# Patient Record
Sex: FEMALE | Race: OTHER | Employment: FULL TIME | URBAN - METROPOLITAN AREA
[De-identification: names, ages, dates, MRNs, and addresses within clinical notes are randomized per-mention and may not be internally consistent; named-entity substitution may affect disease eponyms.]

---

## 2019-05-28 ENCOUNTER — HOSPITAL ENCOUNTER (OUTPATIENT)
Dept: PHYSICAL THERAPY | Age: 54
Discharge: HOME | End: 2019-05-28
Payer: COMMERCIAL

## 2019-05-28 ENCOUNTER — HOSPITAL ENCOUNTER (OUTPATIENT)
Dept: SURGERY | Age: 54
Discharge: HOME | End: 2019-05-28
Payer: COMMERCIAL

## 2019-05-28 ENCOUNTER — HOME HEALTH ADMISSION (OUTPATIENT)
Dept: HOME HEALTH SERVICES | Facility: HOME HEALTH | Age: 54
End: 2019-05-28
Payer: COMMERCIAL

## 2019-05-28 VITALS
TEMPERATURE: 96.1 F | HEART RATE: 57 BPM | WEIGHT: 188.13 LBS | DIASTOLIC BLOOD PRESSURE: 81 MMHG | OXYGEN SATURATION: 100 % | BODY MASS INDEX: 36.93 KG/M2 | RESPIRATION RATE: 18 BRPM | HEIGHT: 60 IN | SYSTOLIC BLOOD PRESSURE: 154 MMHG

## 2019-05-28 LAB
ANION GAP SERPL CALC-SCNC: 8 MMOL/L (ref 7–16)
APPEARANCE UR: CLEAR
APTT PPP: 31.5 SEC (ref 24.7–39.8)
ATRIAL RATE: 55 BPM
BACTERIA SPEC CULT: ABNORMAL
BASOPHILS # BLD: 0.1 K/UL (ref 0–0.2)
BASOPHILS NFR BLD: 1 % (ref 0–2)
BILIRUB UR QL: NEGATIVE
BUN SERPL-MCNC: 25 MG/DL (ref 6–23)
CALCIUM SERPL-MCNC: 9 MG/DL (ref 8.3–10.4)
CALCULATED P AXIS, ECG09: 22 DEGREES
CALCULATED R AXIS, ECG10: -19 DEGREES
CALCULATED T AXIS, ECG11: 16 DEGREES
CHLORIDE SERPL-SCNC: 103 MMOL/L (ref 98–107)
CO2 SERPL-SCNC: 28 MMOL/L (ref 21–32)
COLOR UR: YELLOW
CREAT SERPL-MCNC: 0.72 MG/DL (ref 0.6–1)
DIAGNOSIS, 93000: NORMAL
DIFFERENTIAL METHOD BLD: NORMAL
EOSINOPHIL # BLD: 0.2 K/UL (ref 0–0.8)
EOSINOPHIL NFR BLD: 2 % (ref 0.5–7.8)
ERYTHROCYTE [DISTWIDTH] IN BLOOD BY AUTOMATED COUNT: 12.8 % (ref 11.9–14.6)
GLUCOSE SERPL-MCNC: 87 MG/DL (ref 65–100)
GLUCOSE UR STRIP.AUTO-MCNC: NEGATIVE MG/DL
HCT VFR BLD AUTO: 39.4 % (ref 35.8–46.3)
HGB BLD-MCNC: 12.9 G/DL (ref 11.7–15.4)
HGB UR QL STRIP: NEGATIVE
IMM GRANULOCYTES # BLD AUTO: 0 K/UL (ref 0–0.5)
IMM GRANULOCYTES NFR BLD AUTO: 0 % (ref 0–5)
INR PPP: 0.9
KETONES UR QL STRIP.AUTO: NEGATIVE MG/DL
LEUKOCYTE ESTERASE UR QL STRIP.AUTO: NEGATIVE
LYMPHOCYTES # BLD: 3 K/UL (ref 0.5–4.6)
LYMPHOCYTES NFR BLD: 37 % (ref 13–44)
MCH RBC QN AUTO: 29.9 PG (ref 26.1–32.9)
MCHC RBC AUTO-ENTMCNC: 32.7 G/DL (ref 31.4–35)
MCV RBC AUTO: 91.4 FL (ref 79.6–97.8)
MONOCYTES # BLD: 0.7 K/UL (ref 0.1–1.3)
MONOCYTES NFR BLD: 8 % (ref 4–12)
NEUTS SEG # BLD: 4 K/UL (ref 1.7–8.2)
NEUTS SEG NFR BLD: 51 % (ref 43–78)
NITRITE UR QL STRIP.AUTO: NEGATIVE
NRBC # BLD: 0 K/UL (ref 0–0.2)
P-R INTERVAL, ECG05: 214 MS
PH UR STRIP: 6.5 [PH] (ref 5–9)
PLATELET # BLD AUTO: 274 K/UL (ref 150–450)
PMV BLD AUTO: 11.4 FL (ref 9.4–12.3)
POTASSIUM SERPL-SCNC: 3.9 MMOL/L (ref 3.5–5.1)
PROT UR STRIP-MCNC: NEGATIVE MG/DL
PROTHROMBIN TIME: 12.7 SEC (ref 11.7–14.5)
Q-T INTERVAL, ECG07: 444 MS
QRS DURATION, ECG06: 104 MS
QTC CALCULATION (BEZET), ECG08: 424 MS
RBC # BLD AUTO: 4.31 M/UL (ref 4.05–5.2)
SERVICE CMNT-IMP: ABNORMAL
SODIUM SERPL-SCNC: 139 MMOL/L (ref 136–145)
SP GR UR REFRACTOMETRY: 1.01 (ref 1–1.02)
UROBILINOGEN UR QL STRIP.AUTO: 0.2 EU/DL (ref 0.2–1)
VENTRICULAR RATE, ECG03: 55 BPM
WBC # BLD AUTO: 7.9 K/UL (ref 4.3–11.1)

## 2019-05-28 PROCEDURE — 97161 PT EVAL LOW COMPLEX 20 MIN: CPT

## 2019-05-28 PROCEDURE — 85610 PROTHROMBIN TIME: CPT

## 2019-05-28 PROCEDURE — 80048 BASIC METABOLIC PNL TOTAL CA: CPT

## 2019-05-28 PROCEDURE — 85025 COMPLETE CBC W/AUTO DIFF WBC: CPT

## 2019-05-28 PROCEDURE — 81003 URINALYSIS AUTO W/O SCOPE: CPT

## 2019-05-28 PROCEDURE — 87641 MR-STAPH DNA AMP PROBE: CPT

## 2019-05-28 PROCEDURE — 85730 THROMBOPLASTIN TIME PARTIAL: CPT

## 2019-05-28 PROCEDURE — 36415 COLL VENOUS BLD VENIPUNCTURE: CPT

## 2019-05-28 PROCEDURE — 93005 ELECTROCARDIOGRAM TRACING: CPT | Performed by: ANESTHESIOLOGY

## 2019-05-28 PROCEDURE — 77030027138 HC INCENT SPIROMETER -A

## 2019-05-28 RX ORDER — ATORVASTATIN CALCIUM 10 MG/1
10 TABLET, FILM COATED ORAL
COMMUNITY

## 2019-05-28 RX ORDER — DICLOFENAC SODIUM 10 MG/G
2 GEL TOPICAL 4 TIMES DAILY
COMMUNITY

## 2019-05-28 RX ORDER — ASPIRIN 81 MG/1
81 TABLET ORAL DAILY
COMMUNITY
End: 2019-06-19

## 2019-05-28 NOTE — PROGRESS NOTES
05/28/19 0730   Oxygen Therapy   O2 Sat (%) 100 %   Pulse via Oximetry 67 beats per minute   O2 Device Room air   Pre-Treatment   Breath Sounds Bilateral Clear   Pre FEV1 (liters) 2.2 liters   % Predicted 91   Incentive Spirometry Treatment   Actual Volume (ml) 2000 ml     Initial respiratory Assessment completed with pt. Pt was interviewed and evaluated in Joint camp prior to surgery. Patient ID:  Bobbie Fothergill  [id number]  19 y.o.  1965  Surgeon: Dr. Kierra Alvarado  Date of Surgery: 6/18/2019  Procedure: Total Left Knee Arthroplasty  Primary Care Physician: Juve [facility] -[phone]  Specialists:                                  Pt instructed in the use of Incentive Spirometry. Pt instructed to bring Incentive Spirometer back on date of surgery & to start using Is upon return to pt room.     Pt taught proper cough technique    History of smoking:   NONE                                                       Quit date:           Secondhand smoke:PARENTS      Past procedures with Oxygen desaturation:DENIES    Past Medical History:   Diagnosis Date    Arthritis     knees    HDL deficiency     managed with medication    Hypertension     Rheumatic fever     Scarlet fever as a child                                                                                                                                                     Respiratory history:DENIES SOB                                                                   Respiratory meds:  DENIES                                       FAMILY PRESENT:                                                              NO                                        PAST SLEEP STUDY:                       DENIES  HX OF ENMA:                                         DENIES                                     ENMA assessment:                                               SLEEPS ON SIDE     ,        BACK      &          STOMACH PHYSICAL EXAM   Body mass index is 37.36 kg/m².    Visit Vitals  /81   Pulse (!) 57   Temp 96.1 °F (35.6 °C)   Resp 18   Ht 4' 11.5\" (1.511 m)   Wt 85.3 kg (188 lb 2 oz)   SpO2 100%   BMI 37.36 kg/m²     Neck circumference:   36.5   cm    Loud snoring:        YES                                Witnessed apnea or wakening gasping or choking:,             DENIES,                                                                                                    Awakens with headaches:                                                  DENIES    Morning or daytime tiredness/ sleepiness:                    DENIES                                                                                      Dry mouth or sore throat in morning:                                                                                       DENIES    Sanchez stage:  4    SACS score:6    STOP/BANG:3                              CPAP:                       NONE       Sleepiness Scale:     Sitting and reading 0    Watching TV 1    Sitting inactive in a public place 0    As a passenger in a car for an hour without a break 0    Lying down to rest in the afternoon when circumstances Permits 1    Sitting and talking to someone 0    Sitting quietly after lunch without alcohol 0    In a car, while stopped for a few minutes 0    Total :  2                                           CONT SAT HS            Referrals:    Pt. Phone Number:

## 2019-05-28 NOTE — PERIOP NOTES
Lab results within anesthesia guidelines    Recent Results (from the past 12 hour(s))   CBC WITH AUTOMATED DIFF    Collection Time: 05/28/19  7:40 AM   Result Value Ref Range    WBC 7.9 4.3 - 11.1 K/uL    RBC 4.31 4.05 - 5.2 M/uL    HGB 12.9 11.7 - 15.4 g/dL    HCT 39.4 35.8 - 46.3 %    MCV 91.4 79.6 - 97.8 FL    MCH 29.9 26.1 - 32.9 PG    MCHC 32.7 31.4 - 35.0 g/dL    RDW 12.8 11.9 - 14.6 %    PLATELET 722 498 - 434 K/uL    MPV 11.4 9.4 - 12.3 FL    ABSOLUTE NRBC 0.00 0.0 - 0.2 K/uL    DF AUTOMATED      NEUTROPHILS 51 43 - 78 %    LYMPHOCYTES 37 13 - 44 %    MONOCYTES 8 4.0 - 12.0 %    EOSINOPHILS 2 0.5 - 7.8 %    BASOPHILS 1 0.0 - 2.0 %    IMMATURE GRANULOCYTES 0 0.0 - 5.0 %    ABS. NEUTROPHILS 4.0 1.7 - 8.2 K/UL    ABS. LYMPHOCYTES 3.0 0.5 - 4.6 K/UL    ABS. MONOCYTES 0.7 0.1 - 1.3 K/UL    ABS. EOSINOPHILS 0.2 0.0 - 0.8 K/UL    ABS. BASOPHILS 0.1 0.0 - 0.2 K/UL    ABS. IMM.  GRANS. 0.0 0.0 - 0.5 K/UL   METABOLIC PANEL, BASIC    Collection Time: 05/28/19  7:40 AM   Result Value Ref Range    Sodium 139 136 - 145 mmol/L    Potassium 3.9 3.5 - 5.1 mmol/L    Chloride 103 98 - 107 mmol/L    CO2 28 21 - 32 mmol/L    Anion gap 8 7 - 16 mmol/L    Glucose 87 65 - 100 mg/dL    BUN 25 (H) 6 - 23 MG/DL    Creatinine 0.72 0.6 - 1.0 MG/DL    GFR est AA >60 >60 ml/min/1.73m2    GFR est non-AA >60 >60 ml/min/1.73m2    Calcium 9.0 8.3 - 10.4 MG/DL   PROTHROMBIN TIME + INR    Collection Time: 05/28/19  7:40 AM   Result Value Ref Range    Prothrombin time 12.7 11.7 - 14.5 sec    INR 0.9     PTT    Collection Time: 05/28/19  7:40 AM   Result Value Ref Range    aPTT 31.5 24.7 - 39.8 SEC   URINALYSIS W/ RFLX MICROSCOPIC    Collection Time: 05/28/19  7:40 AM   Result Value Ref Range    Color YELLOW      Appearance CLEAR      Specific gravity 1.011 1.001 - 1.023      pH (UA) 6.5 5.0 - 9.0      Protein NEGATIVE  NEG mg/dL    Glucose NEGATIVE  mg/dL    Ketone NEGATIVE  NEG mg/dL    Bilirubin NEGATIVE  NEG      Blood NEGATIVE  NEG Urobilinogen 0.2 0.2 - 1.0 EU/dL    Nitrites NEGATIVE  NEG      Leukocyte Esterase NEGATIVE  NEG

## 2019-05-28 NOTE — PERIOP NOTES
Patient verified name and . Order for consent was found in EHR and matches case posting; patient verified. Type 3 surgery, PAT joint camp assessment complete. Labs per surgeon: CBC, BMP, PT/PTT, UA and MRSA swab ; results within anesthesia guidelines  Labs per anesthesia protocol: no additional labs required  EKG:within anesthesia guidelines, old EKG tracing placed on chart for anesthesia reference    MRSA/MSSA swab collected; pharmacy to review and dose antibiotic as appropriate. Hibiclens and instructions to return bottle on DOS given per hospital policy. Patient provided with handouts including Guide to Surgery, Pain Management, Hand Hygiene, Blood Transfusion Education, and [facility] Brochure. Patient answered medical/surgical history questions at their best of ability. All prior to admission medications documented in [facility]. Original medication prescription bottle not visualized during patient appointment. Patient instructed to hold all vitamins 7 days prior to surgery and NSAIDS 5 days prior to surgery. Prescription medications to hold: Diclofenac po and gel x 5 days and Phentermine x 5 days    Patient instructed to continue previous medications as prescribed prior to surgery and to take the following medications the day of surgery according to anesthesia guidelines with a small sip of water: ASA 81mg and Acyclovir. Patient teach back successful and patient demonstrates knowledge of instruction.

## 2019-05-28 NOTE — PROGRESS NOTES
Tim Ortiz  : (38 y.o.) Joint [facility] at [address], [place] [address].  [phone]       Physical Therapy Prehab Plan of Treatment and Evaluation Summary:2019    ICD-10: Treatment Diagnosis:   · Pain in Left Knee (M25.562)  · Stiffness of Left Knee, Not elsewhere classified (I43.031)  Precautions/Allergies:   Pcn [penicillins]  MEDICAL/REFERRING DIAGNOSIS:  Unilateral primary osteoarthritis, left knee [M17.12]  REFERRING PHYSICIAN: Tanya Tobar MD  DATE OF SURGERY: 19    Assessment:   Comments:  She is here alone. She had a R TKA in  and did well after that. She plans on going home at [place] with her spouse's assist. She is motivated for surgery. PROBLEM LIST (Impacting functional limitations):  Ms. Sean Das presents with the following left lower extremity(s) problems:  1. Strength  2. Range of Motion  3. Home Exercise Program  4. Pain   INTERVENTIONS PLANNED:  1. Home Exercise Program  2. Educational Discussion      TREATMENT PLAN: Effective Dates: 2019 TO 2019. Frequency/Duration: Patient to continue to perform home exercise program at least twice per day up until her surgery. GOALS: (Goals have been discussed and agreed upon with patient.)  Discharge Goals: Time Frame: 1 Day  1. Patient will demonstrate independence with a home exercise program designed to increase strength, range of motion and pain control to minimize functional deficits and optimize patient for total joint replacement. Rehabilitation Potential For Stated Goals: Good  Regarding Afshan Villaseñor's therapy, I certify that the treatment plan above will be carried out by a therapist or under their direction.   Thank you for this referral,  Kameron Alcazar, PT               HISTORY:   Present Symptoms:  Pain Intensity 1: 9(at its worst)  Pain Location 1: Knee  Pain Orientation 1: Anterior, Lateral, Left, Medial   History of Present Injury/Illness (Reason for Referral):  Medical/Referring Diagnosis: Unilateral primary osteoarthritis, left knee [M17.12]   Past Medical History/Comorbidities:   Ms. Meet Everett  has a past medical history of Arthritis. She also has no past medical history of Aneurysm ([facility] [address] Arrhythmia, Asthma, Autoimmune disease ([address] CAD (coronary artery disease), Cancer ([address] Chronic kidney disease, Chronic pain, Coagulation defects, COPD, Diabetes ([address] Difficult intubation, GERD (gastroesophageal reflux disease), Heart failure ([address] Hypertension, Liver disease, Malignant hyperthermia due to anesthesia, Nausea & vomiting, Pseudocholinesterase deficiency, Psychiatric disorder, PUD (peptic ulcer disease), Seizures ([address] Stroke ([facility] [address] Thromboembolus ([facility] [address] Thyroid disease, Unspecified adverse effect of anesthesia, or Unspecified sleep apnea. Ms. Meet Everett  has a past surgical history that includes hx knee arthroscopy; hx breast reduction; hx tonsillectomy; hx appendectomy; and hx abdominoplasty. Social History/Living Environment:   Home Environment: Private residence  # Steps to Enter: 0  One/Two Story Residence: One story  Living Alone: No  Support Systems: Spouse/Significant Other/Partner  Patient Expects to be Discharged to[de-identified] Private residence  Current DME Used/Available at Home: [employer], straight  Tub or Shower Type: Shower  Work/Activity:   at a [employer].  Light activity level  Dominant Side:  LEFT  Current Medications:  See Pre-assessment nursing note   Number of Personal Factors/Comorbidities that affect the Plan of Care: 1-2: MODERATE COMPLEXITY   EXAMINATION:   ADLs (Current Functional Status):   Ambulation:  [x] Independent  [] Walk Indoors Only  [x] Walk Outdoors  [] Use Assistive Device  [] Use Wheelchair Only Dressing:  [x] Independent  Requires Assistance from Someone for:  [] Sock/Shoes  [] Pants  [] Everything   Bathing/Showering:   [x] Independent  [] Requires Assistance from Someone  [] Sponge Bath Only Household Activities:  [x] Routine house and yard work  [] Light Housework Only  [] None   Observation/Orthostatic Postural Assessment:    Genu varus left, Leg length discrepancy, left(L LE 1/8\" shorter than R)  ROM/Flexibility:   AROM: Within functional limits(R LE)                LLE AROM  L Knee Flexion: 100  L Knee Extension: 9          Strength:   Strength: Generally decreased, functional(R LE 4/5)       LLE Strength  L Hip Flexion: 4  L Knee Extension: 4  L Ankle Dorsiflexion: 4          Functional Mobility:    Sensation: Intact(R LE)    Stand to Sit: Independent  Sit to Stand: Independent  Stand Pivot Transfers: Independent  Distance (ft): 1000 Feet (ft)  Ambulation - Level of Assistance: Independent  Speed/Audelia: Pace decreased (<100 feet/min)  Gait Abnormalities: Antalgic          Balance:    Sitting: Intact  Standing: Intact   Body Structures Involved:  1. Bones  2. Joints  3. Muscles Body Functions Affected:  1. Neuromusculoskeletal  2. Movement Related Activities and Participation Affected:  1. General Tasks and Demands  2. Mobility   Number of elements that affect the Plan of Care: 4+: HIGH COMPLEXITY   CLINICAL PRESENTATION:   Presentation: Stable and uncomplicated: LOW COMPLEXITY   CLINICAL DECISION MAKING:   Outcome Measure: Tool Used: Lower Extremity Functional Scale (LEFS)  Score:  Initial: 34/80 Most Recent: X/80 (Date: -- )   Interpretation of Score: 20 questions each scored on a 5 point scale with 0 representing \"extreme difficulty or unable to perform\" and 4 representing \"no difficulty\". The lower the score, the greater the functional disability. 80/80 represents no disability. Minimal detectable change is 9 points. Medical Necessity:   · Ms. Bernadine Serrano is expected to optimize her lower extremity strength and ROM in preparation for joint replacement surgery.   Reason for Services/Other Comments:  · Achieve baseline assesment of musculoskeletal system, functional mobility and home environment. , educate in PT HEP in preparation for surgery, educate in hospital plan of care. Use of outcome tool(s) and clinical judgement create a POC that gives a: Clear prediction of patient's progress: LOW COMPLEXITY   TREATMENT:   Treatment/Session Assessment:  Patient was instructed in PT- HEP to increase strength and ROM in LEs. Answered all questions. · Post session pain:  2  · Compliance with Program/Exercises: compliant most of the time.   Total Treatment Duration:  PT Patient Time In/Time Out  Time In: 0715  Time Out: 1210 [facility],

## 2019-05-28 NOTE — PROGRESS NOTES
SW met with pt in Prehab to discuss Left TKA scheduled for 6/18/19. Pt lives with spouse and plans to return home with HHPT. Pt resides in [address] at [place] at \[facility]"" and is agreeabe to [facility]. [facility] referral completed. Pt has a cane and crutches. Pt states she does not need at [facility]. Pt does need a RW. Pt aware RW will be ordered and delivered to the room from [facility]   No additional needs or questions identified at this time.     Saul Carbajal

## 2019-06-10 NOTE — ADVANCED PRACTICE NURSE
Total Joint Surgery Preoperative Chart Review      Patient ID:  Tim Ortiz  [id number]  20 y.o.  1965  Surgeon: Dr. Yemi Chang  Date of Surgery: 6/18/2019  Procedure: Total Left Knee Arthroplasty  Primary Care Physician: Torie Pizano -[phone]  Specialty Physician(s):      Subjective:   Tim Ortiz is a 49 y.o. WHITE OR  female who presents for preoperative evaluation for Total Left Knee arthroplasty. This is a preoperative chart review note based on data collected by the nurse at the surgical Pre-Assessment visit. Past Medical History:   Diagnosis Date    Arthritis     knees    HDL deficiency     managed with medication    Hypertension     Rheumatic fever     Scarlet fever as a child      Past Surgical History:   Procedure Laterality Date    HX ABDOMINOPLASTY  1996    tummy tuck    HX APPENDECTOMY  2009    HX BREAST REDUCTION  1994    HX HYSTEROSCOPY  12/21/2017    HX KNEE ARTHROSCOPY Right 2011    right knee with reconstruction    HX TONSILLECTOMY       Family History   Problem Relation Age of Onset    Lung Disease Mother     Heart Disease Mother     Cancer Mother         breast    Hypertension Father     Lung Disease Father       Social History     Tobacco Use    Smoking status: Never Smoker    Smokeless tobacco: Never Used   Substance Use Topics    Alcohol use: Yes     Comment: socially       Prior to Admission medications    Medication Sig Start Date End Date Taking? Authorizing Provider   DICLOFENAC SODIUM PO Take 75 mg by mouth two (2) times a day. Yes Provider, Historical   vit A/vit C/vit E/zinc/copper (ICAPS AREDS PO) Take 1 Cap by mouth two (2) times a day. Yes Provider, Historical   LOSARTAN PO Take 100 mg by mouth daily. Yes Provider, Historical   atorvastatin (LIPITOR) 10 mg tablet Take 10 mg by mouth nightly. Yes Provider, Historical   aspirin delayed-release 81 mg tablet Take 81 mg by mouth daily.    Yes Provider, Historical diclofenac (VOLTAREN) 1 % gel Apply 2 g to affected area four (4) times daily. Yes Provider, Historical   calcium carb-vit d2-minerals (CALTRATE PLUS) Tab Take 1 Tab by mouth two (2) times a day. 1/4/11  Yes Provider, Historical   acyclovir (ZOVIRAX) 400 mg tablet Take 400 mg by mouth daily. Takes in am  1/4/11  Yes Provider, Historical   phentermine (ADIPEX-P) 37.5 mg tablet Take 37.5 mg by mouth every morning. Yes Provider, Historical   oxycodone CR (OXYCONTIN) 20 mg CR tablet Take 1 Tab by mouth every twelve (12) hours as needed for Pain. 1/14/11   Elizabeth Chung MD   oxycodone (ROXICODONE) 10 mg Tab tablet Take 0.5-1 Tabs by mouth every four (4) hours as needed (1-2 tabs prn). 1/14/11   Elizabeth Chung MD     Allergies   Allergen Reactions    Bee Sting [Sting, Bee] Hives    Pcn [Penicillins] Rash and Itching     redness          Objective:     Physical Exam:   No data found. ECG:    EKG Results     Procedure 720 Value Units Date/Time    EKG, 12 LEAD, INITIAL [[id number]] Collected:  05/28/19 0841    Order Status:  Completed Updated:  05/28/19 1049     Ventricular Rate 55 BPM      Atrial Rate 55 BPM      P-R Interval 214 ms      QRS Duration 104 ms      Q-T Interval 444 ms      QTC Calculation (Bezet) 424 ms      Calculated P Axis 22 degrees      Calculated R Axis -19 degrees      Calculated T Axis 16 degrees      Diagnosis --     Sinus bradycardia with 1st degree A-V block  Incomplete right bundle branch block  Moderate voltage criteria for LVH, may be normal variant  Borderline ECG  When compared with ECG of 04-JAN-2011 09:10,  IL interval has increased  Confirmed by JUN STARR (), Jodie Bear ([id number]) on 5/28/2019 10:49:01 AM            Data Review:   Labs:     Results for Rocky Hollins (MRN [id number]) as of 6/10/2019 14:37   Ref.  Range 5/28/2019 07:40   Sodium Latest Ref Range: 136 - 145 mmol/L 139   Potassium Latest Ref Range: 3.5 - 5.1 mmol/L 3.9   Chloride Latest Ref Range: 98 - 107 mmol/L 103   CO2 Latest Ref Range: 21 - 32 mmol/L 28   Anion gap Latest Ref Range: 7 - 16 mmol/L 8   Glucose Latest Ref Range: 65 - 100 mg/dL 87   BUN Latest Ref Range: 6 - 23 MG/DL 25 (H)   Creatinine Latest Ref Range: 0.6 - 1.0 MG/DL 0.72   Calcium Latest Ref Range: 8.3 - 10.4 MG/DL 9.0   GFR est non-AA Latest Ref Range: >60 ml/min/1.73m2 >60   GFR est AA Latest Ref Range: >60 ml/min/1.73m2 >60       Total Joint Surgery Pre-Assessment Recommendations:       He/she is a moderate risk for sleep apnea but is not interested in additional work up at this time. Will initiate perioperative ENMA precautions. Recommend continuous saturation monitoring hours of sleep, during hospitalization.                 Signed By: Padmini Raya NP-C    Isabel 10, 2019

## 2019-06-14 NOTE — H&P
H&P    Patient ID:  Marie Randolph  [id number]  85 y.o.  1965  Surgeon:  Carrie Baca MD  Date of Surgery: * No surgery date entered *  Procedure: Left Knee Total Arthroplasty  Primary Care Physician: Marbella Stapleton MD        Subjective:  Marie Randolph is a 49 y.o. WHITE OR  female who presents with Left Knee pain. She has history of Left Knee pain for several months. Symptoms worse with walking long distances and relieved with rest. Conservative treatment consisting of  activity modification and injections have not helped. The patient lives with their family. The patients goal after surgery is improved pain and function. Past Medical History:   Diagnosis Date    Arthritis     knees    HDL deficiency     managed with medication    Hypertension     Rheumatic fever     Scarlet fever as a child      Past Surgical History:   Procedure Laterality Date    HX ABDOMINOPLASTY  1996    tummy tuck    HX APPENDECTOMY  2009    HX BREAST REDUCTION  1994    HX HYSTEROSCOPY  12/21/2017    HX KNEE ARTHROSCOPY Right 2011    right knee with reconstruction    HX TONSILLECTOMY       Family History   Problem Relation Age of Onset    Lung Disease Mother     Heart Disease Mother     Cancer Mother         breast    Hypertension Father     Lung Disease Father       Social History     Tobacco Use    Smoking status: Never Smoker    Smokeless tobacco: Never Used   Substance Use Topics    Alcohol use: Yes     Comment: socially       Prior to Admission medications    Medication Sig Start Date End Date Taking? Authorizing Provider   DICLOFENAC SODIUM PO Take 75 mg by mouth two (2) times a day. Provider, Historical   vit A/vit C/vit E/zinc/copper (ICAPS AREDS PO) Take 1 Cap by mouth two (2) times a day. Provider, Historical   LOSARTAN PO Take 100 mg by mouth daily. Provider, Historical   atorvastatin (LIPITOR) 10 mg tablet Take 10 mg by mouth nightly.     Provider, Historical   aspirin delayed-release 81 mg tablet Take 81 mg by mouth daily. Provider, Historical   diclofenac (VOLTAREN) 1 % gel Apply 2 g to affected area four (4) times daily. Provider, Historical   oxycodone CR (OXYCONTIN) 20 mg CR tablet Take 1 Tab by mouth every twelve (12) hours as needed for Pain. 1/14/11   Denise Jules MD   oxycodone (ROXICODONE) 10 mg Tab tablet Take 0.5-1 Tabs by mouth every four (4) hours as needed (1-2 tabs prn). 1/14/11   Denise Jules MD   calcium carb-vit d2-minerals (CALTRATE PLUS) Tab Take 1 Tab by mouth two (2) times a day. 1/4/11   Provider, Historical   acyclovir (ZOVIRAX) 400 mg tablet Take 400 mg by mouth daily. Takes in am  1/4/11   Provider, Historical   phentermine (ADIPEX-P) 37.5 mg tablet Take 37.5 mg by mouth every morning. Provider, Historical     Allergies   Allergen Reactions    Bee Sting [Sting, Bee] Hives    Pcn [Penicillins] Rash and Itching     redness        REVIEW OF SYSTEMS:  CONSTITUTIONAL: Denies fever, decreased appetite, weight loss/gain, night sweats or fatigue. HEENT: Denies vision or hearing changes. denies glasses. denies hearing aids. CARDIAC: Denies CP, palpitations, rheumatic fever, murmur, peripheral edema, carotid artery disease or syncopal episodes. RESPIRATORY: Denies dyspnea on exertion, asthma, COPD or orthopnea. GI: Denies GERD, history of GI bleed or melena, PUD, hepatitis or cirrhosis. : Denies dysuria, hematuria. denies BPH symptoms. HEMATOLOGIC: Denies anemia or blood disorders. ENDOCRINE: Denies thyroid disease. MUSCULOSKELETAL: See HPI. NEUROLOGIC: Denies seizure, peripheral neuropathy or memory loss. PSYCH: Denies depression, anxiety or insomnia. SKIN: Denies rash or open sores. Objective:    PHYSICAL EXAM  GENERAL: No data found. EYES: PERRL. EOM intact. MOUTH:Teeth and Gums normal. NECK: Full ROM. Trachea midline. No thyromegaly or JVD. CARDIOVASCULAR: Regular rate and rhythm. No murmur or gallops.  No carotid bruits. Peripheral pulses: radial 2 +, PT 2+, DP 2+ bilaterally. LUNGS: CTA bilaterally. No wheezes, rhonchi or rales. GI: positive BS. Abdomen nontender. NEUROLOGIC: Alert and oriented x 3. Bilateral equal strong had grasp and bilateral equal strong plantar flexion and dorsiflexion. GAIT: abnormal MUSCULOSKELETAL: ROM: full with pain. Tenderness: over the medial and lateral joint lines. Crepitus: present. SKIN: No rash, bruising, swelling, redness or warmth. Labs:  No results found for this or any previous visit (from the past 24 hour(s)). Xray Left Knee: joint space narrowing    Assessment:  Advanced Left Knee Osteoarthritis. Total Left Knee Arthroplasty Indicated. There is no problem list on file for this patient. Plan:  I have advised the patient of the risks and consequences, including possible complications of performing total joint replacement, as well as not doing this operation. The patient had the opportunity to ask questions and have them answered to their satisfaction.      Signed:  MARTHA Beckford 6/14/2019

## 2019-06-17 ENCOUNTER — ANESTHESIA EVENT (OUTPATIENT)
Dept: SURGERY | Age: 54
DRG: 470 | End: 2019-06-17
Payer: COMMERCIAL

## 2019-06-18 ENCOUNTER — HOSPITAL ENCOUNTER (INPATIENT)
Age: 54
LOS: 1 days | Discharge: HOME HEALTH | DRG: 470 | End: 2019-06-19
Attending: ORTHOPAEDIC SURGERY | Admitting: ORTHOPAEDIC SURGERY
Payer: COMMERCIAL

## 2019-06-18 ENCOUNTER — ANESTHESIA (OUTPATIENT)
Dept: SURGERY | Age: 54
DRG: 470 | End: 2019-06-18
Payer: COMMERCIAL

## 2019-06-18 DIAGNOSIS — Z96.652 S/P TKR (TOTAL KNEE REPLACEMENT) USING CEMENT, LEFT: Primary | ICD-10-CM

## 2019-06-18 PROBLEM — M17.12 ARTHRITIS OF KNEE, LEFT: Status: ACTIVE | Noted: 2019-06-18

## 2019-06-18 LAB
ABO + RH BLD: NORMAL
BLOOD GROUP ANTIBODIES SERPL: NORMAL
GLUCOSE BLD STRIP.AUTO-MCNC: 102 MG/DL (ref 65–100)
HGB BLD-MCNC: 10.9 G/DL (ref 11.7–15.4)
SPECIMEN EXP DATE BLD: NORMAL

## 2019-06-18 PROCEDURE — 77030003602 HC NDL NRV BLK BBMI -B: Performed by: ANESTHESIOLOGY

## 2019-06-18 PROCEDURE — 76010000162 HC OR TIME 1.5 TO 2 HR INTENSV-TIER 1: Performed by: ORTHOPAEDIC SURGERY

## 2019-06-18 PROCEDURE — 77030034849: Performed by: ORTHOPAEDIC SURGERY

## 2019-06-18 PROCEDURE — 76210000016 HC OR PH I REC 1 TO 1.5 HR: Performed by: ORTHOPAEDIC SURGERY

## 2019-06-18 PROCEDURE — 77030006720 HC BLD PAT RMR ZIMM -B: Performed by: ORTHOPAEDIC SURGERY

## 2019-06-18 PROCEDURE — 77030020782 HC GWN BAIR PAWS FLX 3M -B: Performed by: ANESTHESIOLOGY

## 2019-06-18 PROCEDURE — 86580 TB INTRADERMAL TEST: CPT | Performed by: ORTHOPAEDIC SURGERY

## 2019-06-18 PROCEDURE — 77030016544 HC BLD SAW RECIP1 STRY -B: Performed by: ORTHOPAEDIC SURGERY

## 2019-06-18 PROCEDURE — 94762 N-INVAS EAR/PLS OXIMTRY CONT: CPT

## 2019-06-18 PROCEDURE — 77030031139 HC SUT VCRL2 J&J -A: Performed by: ORTHOPAEDIC SURGERY

## 2019-06-18 PROCEDURE — 77030013819 HC MX SYS CEM ZIMM -B: Performed by: ORTHOPAEDIC SURGERY

## 2019-06-18 PROCEDURE — 77030007880 HC KT SPN EPDRL BBMI -B: Performed by: ANESTHESIOLOGY

## 2019-06-18 PROCEDURE — 86900 BLOOD TYPING SEROLOGIC ABO: CPT

## 2019-06-18 PROCEDURE — 85018 HEMOGLOBIN: CPT

## 2019-06-18 PROCEDURE — 97530 THERAPEUTIC ACTIVITIES: CPT

## 2019-06-18 PROCEDURE — 77030003665 HC NDL SPN BBMI -A: Performed by: ANESTHESIOLOGY

## 2019-06-18 PROCEDURE — 74011250637 HC RX REV CODE- 250/637: Performed by: ORTHOPAEDIC SURGERY

## 2019-06-18 PROCEDURE — 74011250636 HC RX REV CODE- 250/636: Performed by: ORTHOPAEDIC SURGERY

## 2019-06-18 PROCEDURE — 74011250636 HC RX REV CODE- 250/636: Performed by: ANESTHESIOLOGY

## 2019-06-18 PROCEDURE — 76060000034 HC ANESTHESIA 1.5 TO 2 HR: Performed by: ORTHOPAEDIC SURGERY

## 2019-06-18 PROCEDURE — 74011000250 HC RX REV CODE- 250: Performed by: ORTHOPAEDIC SURGERY

## 2019-06-18 PROCEDURE — 74011000302 HC RX REV CODE- 302: Performed by: ORTHOPAEDIC SURGERY

## 2019-06-18 PROCEDURE — 82962 GLUCOSE BLOOD TEST: CPT

## 2019-06-18 PROCEDURE — 77030018836 HC SOL IRR NACL ICUM -A: Performed by: ORTHOPAEDIC SURGERY

## 2019-06-18 PROCEDURE — 65270000029 HC RM PRIVATE

## 2019-06-18 PROCEDURE — 77030005546 HC CATH URETH FOL 3W BARD -A: Performed by: ORTHOPAEDIC SURGERY

## 2019-06-18 PROCEDURE — 74011250636 HC RX REV CODE- 250/636

## 2019-06-18 PROCEDURE — 77030006835 HC BLD SAW SAG STRY -B: Performed by: ORTHOPAEDIC SURGERY

## 2019-06-18 PROCEDURE — 77030018673: Performed by: ORTHOPAEDIC SURGERY

## 2019-06-18 PROCEDURE — 74011000250 HC RX REV CODE- 250

## 2019-06-18 PROCEDURE — 74011000258 HC RX REV CODE- 258: Performed by: ORTHOPAEDIC SURGERY

## 2019-06-18 PROCEDURE — 77030008467 HC STPLR SKN COVD -B: Performed by: ORTHOPAEDIC SURGERY

## 2019-06-18 PROCEDURE — 77030011208: Performed by: ORTHOPAEDIC SURGERY

## 2019-06-18 PROCEDURE — C1776 JOINT DEVICE (IMPLANTABLE): HCPCS | Performed by: ORTHOPAEDIC SURGERY

## 2019-06-18 PROCEDURE — 77030013708 HC HNDPC SUC IRR PULS STRY –B: Performed by: ORTHOPAEDIC SURGERY

## 2019-06-18 PROCEDURE — 97110 THERAPEUTIC EXERCISES: CPT

## 2019-06-18 PROCEDURE — 97161 PT EVAL LOW COMPLEX 20 MIN: CPT

## 2019-06-18 PROCEDURE — 77030019557 HC ELECTRD VES SEAL MEDT -F: Performed by: ORTHOPAEDIC SURGERY

## 2019-06-18 PROCEDURE — 77030018846 HC SOL IRR STRL H20 ICUM -A: Performed by: ORTHOPAEDIC SURGERY

## 2019-06-18 PROCEDURE — 76942 ECHO GUIDE FOR BIOPSY: CPT | Performed by: ORTHOPAEDIC SURGERY

## 2019-06-18 PROCEDURE — 94760 N-INVAS EAR/PLS OXIMETRY 1: CPT

## 2019-06-18 PROCEDURE — 77030027138 HC INCENT SPIROMETER -A

## 2019-06-18 PROCEDURE — 77030020256 HC SOL INJ NACL 0.9%  500ML: Performed by: ORTHOPAEDIC SURGERY

## 2019-06-18 PROCEDURE — C1713 ANCHOR/SCREW BN/BN,TIS/BN: HCPCS | Performed by: ORTHOPAEDIC SURGERY

## 2019-06-18 PROCEDURE — 77030012935 HC DRSG AQUACEL BMS -B: Performed by: ORTHOPAEDIC SURGERY

## 2019-06-18 PROCEDURE — 76010010054 HC POST OP PAIN BLOCK: Performed by: ORTHOPAEDIC SURGERY

## 2019-06-18 PROCEDURE — 97165 OT EVAL LOW COMPLEX 30 MIN: CPT

## 2019-06-18 PROCEDURE — 36415 COLL VENOUS BLD VENIPUNCTURE: CPT

## 2019-06-18 PROCEDURE — 0SRD0J9 REPLACEMENT OF LEFT KNEE JOINT WITH SYNTHETIC SUBSTITUTE, CEMENTED, OPEN APPROACH: ICD-10-PCS | Performed by: ORTHOPAEDIC SURGERY

## 2019-06-18 PROCEDURE — 77030033067 HC SUT PDO STRATFX SPIR J&J -B: Performed by: ORTHOPAEDIC SURGERY

## 2019-06-18 DEVICE — (D)CEMENT BNE HV R 40GM -- DUPE USE ITEM 353850: Type: IMPLANTABLE DEVICE | Site: KNEE | Status: FUNCTIONAL

## 2019-06-18 DEVICE — BASEPLATE TIB SZ 4 KNEE ROT PLATFRM CEM SYS ATTUNE: Type: IMPLANTABLE DEVICE | Site: KNEE | Status: FUNCTIONAL

## 2019-06-18 DEVICE — INSERT TIB SZ 4 THK8MM KNEE POST STBL ROT PLATFRM ATTUNE: Type: IMPLANTABLE DEVICE | Site: KNEE | Status: FUNCTIONAL

## 2019-06-18 DEVICE — COMPONENT FEM SZ 4 L KNEE POST STBL CEM ATTUNE: Type: IMPLANTABLE DEVICE | Site: KNEE | Status: FUNCTIONAL

## 2019-06-18 RX ORDER — PROPOFOL 10 MG/ML
INJECTION, EMULSION INTRAVENOUS
Status: DISCONTINUED | OUTPATIENT
Start: 2019-06-18 | End: 2019-06-18 | Stop reason: HOSPADM

## 2019-06-18 RX ORDER — ROPIVACAINE HYDROCHLORIDE 2 MG/ML
INJECTION, SOLUTION EPIDURAL; INFILTRATION; PERINEURAL AS NEEDED
Status: DISCONTINUED | OUTPATIENT
Start: 2019-06-18 | End: 2019-06-18 | Stop reason: HOSPADM

## 2019-06-18 RX ORDER — SODIUM CHLORIDE 9 MG/ML
100 INJECTION, SOLUTION INTRAVENOUS CONTINUOUS
Status: DISCONTINUED | OUTPATIENT
Start: 2019-06-18 | End: 2019-06-19 | Stop reason: HOSPADM

## 2019-06-18 RX ORDER — ONDANSETRON 2 MG/ML
INJECTION INTRAMUSCULAR; INTRAVENOUS AS NEEDED
Status: DISCONTINUED | OUTPATIENT
Start: 2019-06-18 | End: 2019-06-18 | Stop reason: HOSPADM

## 2019-06-18 RX ORDER — MIDAZOLAM HYDROCHLORIDE 1 MG/ML
2 INJECTION, SOLUTION INTRAMUSCULAR; INTRAVENOUS
Status: DISCONTINUED | OUTPATIENT
Start: 2019-06-18 | End: 2019-06-18 | Stop reason: SDUPTHER

## 2019-06-18 RX ORDER — DEXAMETHASONE SODIUM PHOSPHATE 100 MG/10ML
10 INJECTION INTRAMUSCULAR; INTRAVENOUS ONCE
Status: COMPLETED | OUTPATIENT
Start: 2019-06-19 | End: 2019-06-19

## 2019-06-18 RX ORDER — ASPIRIN 81 MG/1
81 TABLET ORAL EVERY 12 HOURS
Status: DISCONTINUED | OUTPATIENT
Start: 2019-06-18 | End: 2019-06-19 | Stop reason: HOSPADM

## 2019-06-18 RX ORDER — TRAMADOL HYDROCHLORIDE 50 MG/1
50 TABLET ORAL
COMMUNITY

## 2019-06-18 RX ORDER — ACETAMINOPHEN 500 MG
1000 TABLET ORAL EVERY 6 HOURS
Status: DISCONTINUED | OUTPATIENT
Start: 2019-06-19 | End: 2019-06-19 | Stop reason: HOSPADM

## 2019-06-18 RX ORDER — SODIUM CHLORIDE 0.9 % (FLUSH) 0.9 %
5-40 SYRINGE (ML) INJECTION EVERY 8 HOURS
Status: CANCELLED | OUTPATIENT
Start: 2019-06-18

## 2019-06-18 RX ORDER — LOSARTAN POTASSIUM 50 MG/1
100 TABLET ORAL DAILY
Status: DISCONTINUED | OUTPATIENT
Start: 2019-06-19 | End: 2019-06-19 | Stop reason: HOSPADM

## 2019-06-18 RX ORDER — NALOXONE HYDROCHLORIDE 0.4 MG/ML
.2-.4 INJECTION, SOLUTION INTRAMUSCULAR; INTRAVENOUS; SUBCUTANEOUS
Status: DISCONTINUED | OUTPATIENT
Start: 2019-06-18 | End: 2019-06-19 | Stop reason: HOSPADM

## 2019-06-18 RX ORDER — HYDROMORPHONE HYDROCHLORIDE 2 MG/ML
0.5 INJECTION, SOLUTION INTRAMUSCULAR; INTRAVENOUS; SUBCUTANEOUS
Status: DISCONTINUED | OUTPATIENT
Start: 2019-06-18 | End: 2019-06-18 | Stop reason: HOSPADM

## 2019-06-18 RX ORDER — HYDROMORPHONE HYDROCHLORIDE 2 MG/ML
0.5 INJECTION, SOLUTION INTRAMUSCULAR; INTRAVENOUS; SUBCUTANEOUS
Status: DISCONTINUED | OUTPATIENT
Start: 2019-06-18 | End: 2019-06-18 | Stop reason: SDUPTHER

## 2019-06-18 RX ORDER — TRANEXAMIC ACID 100 MG/ML
INJECTION, SOLUTION INTRAVENOUS AS NEEDED
Status: DISCONTINUED | OUTPATIENT
Start: 2019-06-18 | End: 2019-06-18 | Stop reason: HOSPADM

## 2019-06-18 RX ORDER — SODIUM CHLORIDE 0.9 % (FLUSH) 0.9 %
5-40 SYRINGE (ML) INJECTION AS NEEDED
Status: DISCONTINUED | OUTPATIENT
Start: 2019-06-18 | End: 2019-06-19 | Stop reason: HOSPADM

## 2019-06-18 RX ORDER — DEXAMETHASONE SODIUM PHOSPHATE 4 MG/ML
INJECTION, SOLUTION INTRA-ARTICULAR; INTRALESIONAL; INTRAMUSCULAR; INTRAVENOUS; SOFT TISSUE AS NEEDED
Status: DISCONTINUED | OUTPATIENT
Start: 2019-06-18 | End: 2019-06-18 | Stop reason: HOSPADM

## 2019-06-18 RX ORDER — ACETAMINOPHEN 10 MG/ML
1000 INJECTION, SOLUTION INTRAVENOUS ONCE
Status: COMPLETED | OUTPATIENT
Start: 2019-06-18 | End: 2019-06-18

## 2019-06-18 RX ORDER — EPHEDRINE SULFATE 50 MG/ML
INJECTION, SOLUTION INTRAVENOUS AS NEEDED
Status: DISCONTINUED | OUTPATIENT
Start: 2019-06-18 | End: 2019-06-18 | Stop reason: HOSPADM

## 2019-06-18 RX ORDER — CEFAZOLIN SODIUM/WATER 2 G/20 ML
2 SYRINGE (ML) INTRAVENOUS ONCE
Status: COMPLETED | OUTPATIENT
Start: 2019-06-18 | End: 2019-06-18

## 2019-06-18 RX ORDER — PROMETHAZINE HYDROCHLORIDE 25 MG/1
25 TABLET ORAL
Status: DISCONTINUED | OUTPATIENT
Start: 2019-06-18 | End: 2019-06-19 | Stop reason: HOSPADM

## 2019-06-18 RX ORDER — MIDAZOLAM HYDROCHLORIDE 1 MG/ML
5 INJECTION, SOLUTION INTRAMUSCULAR; INTRAVENOUS ONCE
Status: DISCONTINUED | OUTPATIENT
Start: 2019-06-18 | End: 2019-06-18 | Stop reason: SDUPTHER

## 2019-06-18 RX ORDER — SODIUM CHLORIDE, SODIUM LACTATE, POTASSIUM CHLORIDE, CALCIUM CHLORIDE 600; 310; 30; 20 MG/100ML; MG/100ML; MG/100ML; MG/100ML
75 INJECTION, SOLUTION INTRAVENOUS CONTINUOUS
Status: DISCONTINUED | OUTPATIENT
Start: 2019-06-18 | End: 2019-06-18 | Stop reason: HOSPADM

## 2019-06-18 RX ORDER — ONDANSETRON 8 MG/1
8 TABLET, ORALLY DISINTEGRATING ORAL
Status: DISCONTINUED | OUTPATIENT
Start: 2019-06-18 | End: 2019-06-19 | Stop reason: HOSPADM

## 2019-06-18 RX ORDER — LIDOCAINE HYDROCHLORIDE 10 MG/ML
0.1 INJECTION INFILTRATION; PERINEURAL AS NEEDED
Status: DISCONTINUED | OUTPATIENT
Start: 2019-06-18 | End: 2019-06-18 | Stop reason: HOSPADM

## 2019-06-18 RX ORDER — PROPOFOL 10 MG/ML
INJECTION, EMULSION INTRAVENOUS AS NEEDED
Status: DISCONTINUED | OUTPATIENT
Start: 2019-06-18 | End: 2019-06-18 | Stop reason: HOSPADM

## 2019-06-18 RX ORDER — AMOXICILLIN 250 MG
2 CAPSULE ORAL DAILY
Status: DISCONTINUED | OUTPATIENT
Start: 2019-06-19 | End: 2019-06-19 | Stop reason: HOSPADM

## 2019-06-18 RX ORDER — HYDROCODONE BITARTRATE AND ACETAMINOPHEN 5; 325 MG/1; MG/1
2 TABLET ORAL AS NEEDED
Status: DISCONTINUED | OUTPATIENT
Start: 2019-06-18 | End: 2019-06-18 | Stop reason: HOSPADM

## 2019-06-18 RX ORDER — HYDROCODONE BITARTRATE AND ACETAMINOPHEN 5; 325 MG/1; MG/1
2 TABLET ORAL AS NEEDED
Status: DISCONTINUED | OUTPATIENT
Start: 2019-06-18 | End: 2019-06-18 | Stop reason: SDUPTHER

## 2019-06-18 RX ORDER — ZOLPIDEM TARTRATE 5 MG/1
5 TABLET ORAL
Status: DISCONTINUED | OUTPATIENT
Start: 2019-06-18 | End: 2019-06-19 | Stop reason: HOSPADM

## 2019-06-18 RX ORDER — SODIUM CHLORIDE, SODIUM LACTATE, POTASSIUM CHLORIDE, CALCIUM CHLORIDE 600; 310; 30; 20 MG/100ML; MG/100ML; MG/100ML; MG/100ML
75 INJECTION, SOLUTION INTRAVENOUS CONTINUOUS
Status: DISCONTINUED | OUTPATIENT
Start: 2019-06-18 | End: 2019-06-18 | Stop reason: SDUPTHER

## 2019-06-18 RX ORDER — CELECOXIB 200 MG/1
200 CAPSULE ORAL EVERY 12 HOURS
Status: DISCONTINUED | OUTPATIENT
Start: 2019-06-18 | End: 2019-06-19 | Stop reason: HOSPADM

## 2019-06-18 RX ORDER — DIPHENHYDRAMINE HCL 25 MG
25 CAPSULE ORAL
Status: DISCONTINUED | OUTPATIENT
Start: 2019-06-18 | End: 2019-06-19 | Stop reason: HOSPADM

## 2019-06-18 RX ORDER — FENTANYL CITRATE 50 UG/ML
100 INJECTION, SOLUTION INTRAMUSCULAR; INTRAVENOUS ONCE
Status: DISCONTINUED | OUTPATIENT
Start: 2019-06-18 | End: 2019-06-18 | Stop reason: SDUPTHER

## 2019-06-18 RX ORDER — LIDOCAINE HYDROCHLORIDE 10 MG/ML
0.1 INJECTION INFILTRATION; PERINEURAL AS NEEDED
Status: DISCONTINUED | OUTPATIENT
Start: 2019-06-18 | End: 2019-06-18 | Stop reason: SDUPTHER

## 2019-06-18 RX ORDER — OXYCODONE HYDROCHLORIDE 5 MG/1
5 TABLET ORAL
Status: DISCONTINUED | OUTPATIENT
Start: 2019-06-18 | End: 2019-06-18 | Stop reason: HOSPADM

## 2019-06-18 RX ORDER — VANCOMYCIN HYDROCHLORIDE
1250 ONCE
Status: DISCONTINUED | OUTPATIENT
Start: 2019-06-18 | End: 2019-06-18 | Stop reason: HOSPADM

## 2019-06-18 RX ORDER — OXYCODONE HYDROCHLORIDE 5 MG/1
5 TABLET ORAL
Status: DISCONTINUED | OUTPATIENT
Start: 2019-06-18 | End: 2019-06-18 | Stop reason: SDUPTHER

## 2019-06-18 RX ORDER — KETOROLAC TROMETHAMINE 30 MG/ML
INJECTION, SOLUTION INTRAMUSCULAR; INTRAVENOUS AS NEEDED
Status: DISCONTINUED | OUTPATIENT
Start: 2019-06-18 | End: 2019-06-18 | Stop reason: HOSPADM

## 2019-06-18 RX ORDER — MIDAZOLAM HYDROCHLORIDE 1 MG/ML
2 INJECTION, SOLUTION INTRAMUSCULAR; INTRAVENOUS
Status: DISCONTINUED | OUTPATIENT
Start: 2019-06-18 | End: 2019-06-18 | Stop reason: HOSPADM

## 2019-06-18 RX ORDER — MIDAZOLAM HYDROCHLORIDE 1 MG/ML
5 INJECTION, SOLUTION INTRAMUSCULAR; INTRAVENOUS ONCE
Status: COMPLETED | OUTPATIENT
Start: 2019-06-18 | End: 2019-06-18

## 2019-06-18 RX ORDER — OXYCODONE HYDROCHLORIDE 5 MG/1
5-10 TABLET ORAL
Status: DISCONTINUED | OUTPATIENT
Start: 2019-06-18 | End: 2019-06-19 | Stop reason: HOSPADM

## 2019-06-18 RX ORDER — HYDROMORPHONE HYDROCHLORIDE 2 MG/ML
1 INJECTION, SOLUTION INTRAMUSCULAR; INTRAVENOUS; SUBCUTANEOUS
Status: DISCONTINUED | OUTPATIENT
Start: 2019-06-18 | End: 2019-06-19 | Stop reason: HOSPADM

## 2019-06-18 RX ORDER — ATORVASTATIN CALCIUM 10 MG/1
10 TABLET, FILM COATED ORAL
Status: DISCONTINUED | OUTPATIENT
Start: 2019-06-18 | End: 2019-06-19 | Stop reason: HOSPADM

## 2019-06-18 RX ORDER — FENTANYL CITRATE 50 UG/ML
100 INJECTION, SOLUTION INTRAMUSCULAR; INTRAVENOUS ONCE
Status: COMPLETED | OUTPATIENT
Start: 2019-06-18 | End: 2019-06-18

## 2019-06-18 RX ORDER — CEFAZOLIN SODIUM/WATER 2 G/20 ML
2 SYRINGE (ML) INTRAVENOUS EVERY 8 HOURS
Status: COMPLETED | OUTPATIENT
Start: 2019-06-18 | End: 2019-06-19

## 2019-06-18 RX ORDER — MIDAZOLAM HYDROCHLORIDE 1 MG/ML
INJECTION, SOLUTION INTRAMUSCULAR; INTRAVENOUS AS NEEDED
Status: DISCONTINUED | OUTPATIENT
Start: 2019-06-18 | End: 2019-06-18 | Stop reason: HOSPADM

## 2019-06-18 RX ORDER — ACYCLOVIR 800 MG/1
400 TABLET ORAL DAILY
Status: DISCONTINUED | OUTPATIENT
Start: 2019-06-19 | End: 2019-06-19 | Stop reason: HOSPADM

## 2019-06-18 RX ADMIN — SODIUM CHLORIDE, SODIUM LACTATE, POTASSIUM CHLORIDE, AND CALCIUM CHLORIDE 75 ML/HR: 600; 310; 30; 20 INJECTION, SOLUTION INTRAVENOUS at 10:21

## 2019-06-18 RX ADMIN — CELECOXIB 200 MG: 200 CAPSULE ORAL at 20:13

## 2019-06-18 RX ADMIN — ATORVASTATIN CALCIUM 10 MG: 10 TABLET, FILM COATED ORAL at 22:46

## 2019-06-18 RX ADMIN — PROPOFOL 75 MCG/KG/MIN: 10 INJECTION, EMULSION INTRAVENOUS at 11:37

## 2019-06-18 RX ADMIN — ASPIRIN 81 MG: 81 TABLET ORAL at 20:13

## 2019-06-18 RX ADMIN — TUBERCULIN PURIFIED PROTEIN DERIVATIVE 5 UNITS: 5 INJECTION, SOLUTION INTRADERMAL at 10:20

## 2019-06-18 RX ADMIN — OXYCODONE HYDROCHLORIDE 10 MG: 5 TABLET ORAL at 14:50

## 2019-06-18 RX ADMIN — TRANEXAMIC ACID 1000 MG: 100 INJECTION, SOLUTION INTRAVENOUS at 11:37

## 2019-06-18 RX ADMIN — ACETAMINOPHEN 1000 MG: 10 INJECTION, SOLUTION INTRAVENOUS at 17:19

## 2019-06-18 RX ADMIN — MIDAZOLAM HYDROCHLORIDE 2 MG: 1 INJECTION, SOLUTION INTRAMUSCULAR; INTRAVENOUS at 11:27

## 2019-06-18 RX ADMIN — PROPOFOL 40 MG: 10 INJECTION, EMULSION INTRAVENOUS at 11:37

## 2019-06-18 RX ADMIN — EPHEDRINE SULFATE 10 MG: 50 INJECTION, SOLUTION INTRAVENOUS at 11:52

## 2019-06-18 RX ADMIN — Medication 2 G: at 11:27

## 2019-06-18 RX ADMIN — EPHEDRINE SULFATE 10 MG: 50 INJECTION, SOLUTION INTRAVENOUS at 13:01

## 2019-06-18 RX ADMIN — SODIUM CHLORIDE 100 ML/HR: 900 INJECTION, SOLUTION INTRAVENOUS at 14:43

## 2019-06-18 RX ADMIN — ONDANSETRON 4 MG: 2 INJECTION INTRAMUSCULAR; INTRAVENOUS at 12:07

## 2019-06-18 RX ADMIN — Medication 3 AMPULE: at 10:20

## 2019-06-18 RX ADMIN — MIDAZOLAM 3 MG: 1 INJECTION INTRAMUSCULAR; INTRAVENOUS at 10:57

## 2019-06-18 RX ADMIN — SODIUM CHLORIDE, SODIUM LACTATE, POTASSIUM CHLORIDE, AND CALCIUM CHLORIDE: 600; 310; 30; 20 INJECTION, SOLUTION INTRAVENOUS at 11:55

## 2019-06-18 RX ADMIN — EPHEDRINE SULFATE 10 MG: 50 INJECTION, SOLUTION INTRAVENOUS at 12:54

## 2019-06-18 RX ADMIN — OXYCODONE HYDROCHLORIDE 10 MG: 5 TABLET ORAL at 20:05

## 2019-06-18 RX ADMIN — EPHEDRINE SULFATE 10 MG: 50 INJECTION, SOLUTION INTRAVENOUS at 12:22

## 2019-06-18 RX ADMIN — Medication 2 G: at 19:45

## 2019-06-18 RX ADMIN — DEXAMETHASONE SODIUM PHOSPHATE 10 MG: 4 INJECTION, SOLUTION INTRA-ARTICULAR; INTRALESIONAL; INTRAMUSCULAR; INTRAVENOUS; SOFT TISSUE at 11:38

## 2019-06-18 RX ADMIN — Medication 1 AMPULE: at 20:13

## 2019-06-18 RX ADMIN — EPHEDRINE SULFATE 10 MG: 50 INJECTION, SOLUTION INTRAVENOUS at 12:07

## 2019-06-18 RX ADMIN — FENTANYL CITRATE 50 MCG: 50 INJECTION INTRAMUSCULAR; INTRAVENOUS at 10:57

## 2019-06-18 NOTE — INTERVAL H&P NOTE
H&P Update: 
Ivette Glover was seen and examined. History and physical has been reviewed. The patient has been examined.  There have been no significant clinical changes since the completion of the originally dated History and Physical.

## 2019-06-18 NOTE — PROGRESS NOTES
TRANSFER - IN REPORT:    Verbal report received from Pb RN(name) on Mere Gupta  being received from PACU(unit) for routine progression of care      Report consisted of patients Situation, Background, Assessment and   Recommendations(SBAR). Information from the following report(s) SBAR, OR Summary, Intake/Output and MAR was reviewed with the receiving nurse. Opportunity for questions and clarification was provided. Assessment to be  completed upon patients arrival to unit and care assumed.

## 2019-06-18 NOTE — PERIOP NOTES
Betadine lavage:  17.5cc of betadine lot # [id number] , exp. Date 12/2020 ,  in 1000cc of . 9NS Lot # [id number], exp.  Date 02/01/2022:

## 2019-06-18 NOTE — ANESTHESIA PROCEDURE NOTES
Peripheral Block    Start time: 6/18/2019 10:58 AM  End time: 6/18/2019 11:01 AM  Performed by: Terri Fraser MD  Authorized by: Terri Fraser MD       Pre-procedure: Indications: at surgeon's request, post-op pain management and procedure for pain    Preanesthetic Checklist: patient identified, risks and benefits discussed, site marked, timeout performed, anesthesia consent given and patient being monitored    Timeout Time: 10:57          Block Type:   Block Type:   Adductor canal  Laterality:  Left  Monitoring:  Standard ASA monitoring, responsive to questions, continuous pulse ox, oxygen, frequent vital sign checks and heart rate  Injection Technique:  Single shot  Procedures: ultrasound guided    Patient Position: supine  Prep: chlorhexidine    Location:  Mid thigh  Needle Type:  Stimuplex  Needle Gauge:  22 G  Needle Localization:  Ultrasound guidance    Assessment:  Number of attempts:  1  Injection Assessment:  Incremental injection every 5 mL, no paresthesia, ultrasound image on chart, local visualized surrounding nerve on ultrasound, negative aspiration for blood and no intravascular symptoms  Patient tolerance:  Patient tolerated the procedure well with no immediate complications

## 2019-06-18 NOTE — PERIOP NOTES
TRANSFER - OUT REPORT:    Verbal report given to Leonarda Low RN on Marie Saas  being transferred to [facility] for routine post - op       Report consisted of patients Situation, Background, Assessment and   Recommendations(SBAR). Information from the following report(s) OR Summary, Procedure Summary, Intake/Output and MAR was reviewed with the receiving nurse. Opportunity for questions and clarification was provided. Patient transported on room air.

## 2019-06-18 NOTE — OP NOTES
[facility]  Cemented Total Knee Arthroplasty  Patient:Natividad Schroeder   : 1965  Medical Record Number:[id number]  Pre-operative Diagnosis:  Unilateral primary osteoarthritis, left knee [M17.12]  Post-operative Diagnosis: * No post-op diagnosis entered *    Surgeon: Julieta Stewart MD  Assistant: Iglesia Nunez PA-C    Anesthesia: Spinal    Procedure: Total Knee Arthroplasty with use of Bone Cement  The complexity of the total joint surgery requires the use of a first assistant for positioning, retraction and assistance in closure. The patient's Body mass index is 37.36 kg/m²., BMI's greater then 40 make surgical exposure and retraction extremely difficult and increase operative time. Tourniquet Time: none  EBL: 150cc  Additional Findings: Severe DJD  Releases none    Linsday Leal was brought to the operating room and positioned on the operating table. She was anethestized  A barnes catheter was placed preoperatively and IV antibiotics was administered. Prior to the incision being made a timeout was called identifying the patient, procedure ,operative side and surgeon. . The left leg was prepped and draped in the usual sterile manner  An anterior longitudinal incision was accomplished just medial to the tibial tubercle and extending approximal 6 centimeters proximal to the superior pole of the patella. A medial parapatellar capsular incision was performed. The medial capsular flap was elevated around to the insertion of the semimembranous tendon. The patella was everted and the knee flexed and externally rotated. The medial and external menisci were excised. The lateral half of the fat pad excised and the patella femoral ligament was released. The anterior cruciate ligament was resected and the posterior cruciate ligament was substituted. Using extramedullary instrumentation, the tibial cut was accomplished with appropriate posterior slope.   Approxiamately 2 mm of bone was removed from the low side of the tibia. The distal femur was next addressed. A drill hole was made above the intracondylar notch. Using appropriate intramedullary instrumentation,a 6 degree valgus distal cut was accomplished. A femur was sized. The anterior and posterior cuts were then made about the distal femur. The osteophytes were removed from the tibial and femoral surfaces. The flexion and extension gaps were assessed with the appropriate spacer blocks. Additional surgical procedures included none. The flexion and extension gaps were deemed appropriately balanced. The appropriate cutting blocks were then utilized to perform the anterior chamfer, posterior chamfer and notch cuts, with appropriate lateral tranlation accomplished for the patellofemoral groove. The tibia was sized. The tibial base plate was pinned into place with the appropriate external rotation and stem site prepared. A preliminary range of motion was accomplished with the above size trial components. A polyethylene insert allowed the patient to obtain full extension as well as appropriate flexion. The patient's ligaments were stable in flexion and extension to medial and lateral stressing and the alignment was through the appropriate mechanical axis. The patella was then everted. The bone was resected appropriately for a pegged patella button. A trial reduction revealed appropriate tracking through the patellofemoral groove. All trial components were removed and the cut surfaces prepared for cementing with irrigation and debridement of the bone interstices. The implants were cemented into position and pressurized in the usual fashion. Bone and cement debris were meticulously removed. The betadine lavage protocol was used. Bobbie Fothergill knee was placed through range of motion and noted to be stable as mentioned above with the trail components.   The wound was dry, therefore no drain was used. The operative knee was injected with 60cc of Naropin, 10 cc's of morphine and 1 cc of 30mg of Toradol. The capsular layer was closed using a #1 vicryl suture, while subcutaneous layers were closed using 2-0 Vicryl interrupted sutures. Finally the skin was closed using 3-0 Vicryl and Zipline. A sterile bandage was applied. An Iceman cryo pad was applied on the operative leg. Sponge count and needle counts were correct. Mere Gupta left the operating room     Implants:   Implant Name Type Inv.  Item Serial No.  Lot No. LRB No. Used   CEMENT BNE HV R 40GM -- PALACOS R [id number] - [id number]  CEMENT BNE HV R 40GM -- PALACOS R [id number] [place] [id number] Left 2   BASE TIB RP SZ4 FRANCIA -- ATTUNE - [id number]  BASE TIB RP SZ4 FRANCIA -- ATTUNE [id number] [facility] [employer] [id number] Left 1   FEM PS SZ 4 LT FRANCIA -- ATTUNE - [id number]  FEM PS SZ 4 LT FRANCIA -- ATTUNE [id number] [facility] [id number] Left 1   ATTUNE PATELLA MEDIALIZED ANATOMIC 32MM CEMENTED AOX   [id number] [employer] [id number] Left 1   INSERT TIB PS RP SZ4 8MM -- ATTUNE - [id number]  INSERT TIB PS RP SZ4 8MM -- ATTUNE [id number] [facility] [employer] [id number] Left 1     Signed By: Saniya Oconnor MD

## 2019-06-18 NOTE — PROGRESS NOTES
Problem: Self Care Deficits Care Plan (Adult)  Goal: *Acute Goals and Plan of Care (Insert Text)  Description  GOALS:   DISCHARGE GOALS (in preparation for going home/rehab):  3 days  1. Ms. Jose M Zhang will perform one lower body dressing activity with minimal assistance required to demonstrate improved functional mobility and safety. 2.  Ms. Jose M Zhang will perform one lower body bathing activity with minimal assistance required to demonstrate improved functional mobility and safety. 3.  Ms. Jose M Zhang will perform toileting/toilet transfer with contact guard assistance to demonstrate improved functional mobility and safety. 4.  Ms. Jose M Zhang will perform shower transfer with contact guard assistance to demonstrate improved functional mobility and safety. JOINT CAMP OCCUPATIONAL THERAPY TKA: Initial Assessment and Daily Note 6/18/2019  INPATIENT: Hospital Day: 1  Payor: Nilda Teran / Plan: [place] [employer] [employer]UE [employer] SUSAN / Product Type: SUSAN /      NAME/AGE/GENDER: Treva Robison is a 49 y.o. female   PRIMARY DIAGNOSIS:  Unilateral primary osteoarthritis, left knee [M17.12]   Procedure(s) and Anesthesia Type:     * LEFT TOTAL KNEE ARTHROPLASTY   - Spinal (Left)  ICD-10: Treatment Diagnosis:    Pain in Left Knee (M25.562)  Stiffness of Left Knee, Not elsewhere classified (G80.220)      ASSESSMENT:     Ms. Jose M Zhang is s/p Left TKA and presents with decreased weight bearing on L LE and decreased independence with functional mobility and activities of daily living as compared to baseline level of function and safety. Patient would benefit from skilled Occupational Therapy to maximize independence and safety with self-care task and functional mobility. Pt would also benefit from education on adaptive equipment and safety precautions in preparation for going home with spouse. Able to mobilize from bed to reclienr. Pain very well managed. Will do well tomorrow with ADL's.        This section established at most recent assessment   PROBLEM LIST (Impairments causing functional limitations):  Decreased Strength  Decreased ADL/Functional Activities  Decreased Transfer Abilities  Increased Pain  Increased Fatigue  Decreased Flexibility/Joint Mobility  Decreased Knowledge of Precautions   INTERVENTIONS PLANNED: (Benefits and precautions of occupational therapy have been discussed with the patient.)  Activities of daily living training  Adaptive equipment training  Balance training  Clothing management  Donning&doffing training  Theraputic activity     TREATMENT PLAN: Frequency/Duration: Follow patient 1-2tx to address above goals. Rehabilitation Potential For Stated Goals: Excellent     RECOMMENDED REHABILITATION/EQUIPMENT: (at time of discharge pending progress): Continue Skilled Therapy. OCCUPATIONAL PROFILE AND HISTORY:   History of Present Injury/Illness (Reason for Referral): Pt presents this date s/p (left) TKA. Past Medical History/Comorbidities:   Ms. Yulisa Wilkins  has a past medical history of Arthritis, HDL deficiency, Hypertension, and Rheumatic fever. She also has no past medical history of Coagulation defects, COPD, or Unspecified adverse effect of anesthesia. Ms. Yulisa Wilkins  has a past surgical history that includes hx knee arthroscopy (Right, 2011); hx breast reduction (1994); hx tonsillectomy; hx appendectomy (2009); hx abdominoplasty (1996); and hx hysteroscopy (12/21/2017). Social History/Living Environment:   Home Environment: Private residence  # Steps to Enter: 0  One/Two Story Residence: One story  Living Alone: No  Support Systems: Spouse/Significant Other/Partner  Patient Expects to be Discharged to[de-identified] Private residence  Current DME Used/Available at Home: [address] [facility], straight  Tub or Shower Type: Shower  Prior Level of Function/Work/Activity:  Independent prior.       Number of Personal Factors/Comorbidities that affect the Plan of Care: Brief history (0):  LOW COMPLEXITY   ASSESSMENT OF OCCUPATIONAL PERFORMANCE[de-identified]   Most Recent Physical Functioning:   Balance  Sitting: Intact  Standing: Pull to stand; With support       Gross Assessment: Yes  Gross Assessment  AROM: Within functional limits(except left lower extremity s/p TKA)  Strength: Within functional limits(except left lower extremity s/p TKA)            Coordination  Fine Motor Skills-Upper: Left Intact; Right Intact  Gross Motor Skills-Upper: Left Intact; Right Intact         Mental Status  Neurologic State: Alert  Orientation Level: Oriented X4  Cognition: Appropriate decision making  Perception: Appears intact                Basic ADLs (From Assessment) Complex ADLs (From Assessment)   Basic ADL  Feeding: Independent  Oral Facial Hygiene/Grooming: Setup  Bathing: Minimum assistance  Upper Body Dressing: Setup  Lower Body Dressing: Moderate assistance  Toileting: Moderate assistance     Grooming/Bathing/Dressing Activities of Daily Living                       Functional Transfers  Bathroom Mobility: Contact guard assistance  Toilet Transfer : Minimum assistance  Shower Transfer: Moderate assistance     Bed/Mat Mobility  Supine to Sit: (up in chair on contact)  Sit to Supine: (stayed up in chair)  Sit to Stand: Stand-by assistance;Contact guard assistance  Stand to Sit: Stand-by assistance;Contact guard assistance  Bed to Chair: Contact guard assistance  Scooting: Stand-by assistance         Physical Skills Involved:  Range of Motion  Balance  Strength Cognitive Skills Affected (resulting in the inability to perform in a timely and safe manner):  [facility]  Psychosocial Skills Affected:  [facility]    Number of elements that affect the Plan of Care: 1-3:  LOW COMPLEXITY   CLINICAL DECISION MAKIN [address]? ?6 Clicks? Daily Activity Inpatient Short Form  How much help from another person does the patient currently need. .. Total A Lot A Little None   1. Putting on and taking off regular lower body clothing? ? 1   ? 2   ? 3   ? 4   2.   Bathing (including washing, rinsing, drying)? ? 1   ? 2   ? 3   ? 4   3. Toileting, which includes using toilet, bedpan or urinal?   ? 1   ? 2   ? 3   ? 4   4. Putting on and taking off regular upper body clothing? ? 1   ? 2   ? 3   ? 4   5. Taking care of personal grooming such as brushing teeth? ? 1   ? 2   ? 3   ? 4   6. Eating meals? ? 1   ? 2   ? 3   ? 4   © 2007, Trustees of [facility] [employer], under license to [employer]. All rights reserved     Score:  Initial: 18 Most Recent: X (Date: -- )    Interpretation of Tool:  Represents activities that are increasingly more difficult (i.e. Bed mobility, Transfers, Gait). Medical Necessity:     Skilled intervention continues to be required due to Deficits listed above. Reason for Services/Other Comments:  Patient continues to require skilled intervention due to new TKA   . Use of outcome tool(s) and clinical judgement create a POC that gives a: MODERATE COMPLEXITY            TREATMENT:   (In addition to Assessment/Re-Assessment sessions the following treatments were rendered)     Pre-treatment Symptoms/Complaints:    Pain: Initial:   Pain Intensity 1: 3  Post Session:  3     Therapeutic Activity: (    10): Therapeutic activities including Bed transfers, Chair transfers and Ambulation on level ground to improve mobility and balance. Required minimal   to promote motor control of bilateral, upper extremity(s), lower extremity(s). Initial evaluation 10 minutes. Treatment/Session Assessment:     Response to Treatment:  Good, sitting up in recliner. Education:  ? Home Exercises  ? Fall Precautions  ? Hip Precautions ? Going Home Video  ? Knee/Hip Prosthesis Review  ? Walker Management/Safety ?  Adaptive Equipment as Needed       Interdisciplinary Collaboration:   Physical Therapist  Occupational Therapist  Registered Nurse    After treatment position/precautions:   Up in chair  Bed/Chair-wheels locked  Caregiver at bedside  Call light within reach  RN notified     Compliance with Program/Exercises: Compliant all of the time, Will assess as treatment progresses. Recommendations/Intent for next treatment session:  Treatment next visit will focus on increasing Ms. Villaseñor's independence with bed mobility, transfers, self care, functional mobility, modalities for pain, and patient education.       Total Treatment Duration:  OT Patient Time In/Time Out  Time In: 9655  Time Out: 184 KATHY [facility], OT

## 2019-06-18 NOTE — PROGRESS NOTES
Pt is alert and oriented x3. Pt is able to dorsi/ plantar flex and has +2 pedal pulses. BLE numb to touch  Dressing to surgical incision is clean, dry, and intact. Pain is controlled at this time. Bed is low and locked, call light in reach. IS at bedside and pt demonstrated use. No needs stated.

## 2019-06-18 NOTE — ANESTHESIA PREPROCEDURE EVALUATION
Relevant Problems   No relevant active problems       Anesthetic History   No history of anesthetic complications            Review of Systems / Medical History  Patient summary reviewed and pertinent labs reviewed    Pulmonary  Within defined limits                 Neuro/Psych   Within defined limits           Cardiovascular    Hypertension: well controlled              Exercise tolerance: >4 METS     GI/Hepatic/Renal  Within defined limits              Endo/Other        Arthritis     Other Findings              Physical Exam    Airway  Mallampati: II  TM Distance: 4 - 6 cm  Neck ROM: normal range of motion   Mouth opening: Normal     Cardiovascular  Regular rate and rhythm,  S1 and S2 normal,  no murmur, click, rub, or gallop             Dental         Pulmonary  Breath sounds clear to auscultation               Abdominal         Other Findings            Anesthetic Plan    ASA: 2  Anesthesia type: spinal      Post-op pain plan if not by surgeon: peripheral nerve block single      Anesthetic plan and risks discussed with: Patient

## 2019-06-18 NOTE — PROGRESS NOTES
Problem: Mobility Impaired (Adult and Pediatric)  Goal: *Acute Goals and Plan of Care (Insert Text)  Description  GOALS (1-4 days):  (1.)Ms. Tono Miller will move from supine to sit and sit to supine  in bed with SUPERVISION. (2.)Ms. Tono Miller will transfer from bed to chair and chair to bed with SUPERVISION using the least restrictive device. (3.)Ms. Villaseñor will ambulate with STAND BY ASSIST for 300 feet with the least restrictive device. (4.)Ms. Tono Miller will ambulate up/down 3 steps with bilateral  railing with CONTACT GUARD ASSIST with no device. (5.)Ms. Tono Miller will increase left knee ROM to 5°-80°.  ________________________________________________________________________________________________     Outcome: Progressing Towards Goal     PHYSICAL THERAPY JOINT CAMP TKA: Initial Assessment, Treatment Day: Day of Assessment and PM 6/18/2019  INPATIENT: Hospital Day: 1  Payor: Rosaura Villela / [place] [employer] SUSAN / Product Type: SUSAN /      NAME/AGE/GENDER: Robert Perez is a 49 y.o. female   PRIMARY DIAGNOSIS:  Unilateral primary osteoarthritis, left knee [M17.12]   Procedure(s) and Anesthesia Type:     * LEFT TOTAL KNEE ARTHROPLASTY   - Spinal (Left)  ICD-10: Treatment Diagnosis:    Pain in Left Knee (M25.562)  Stiffness of Left Knee, Not elsewhere classified (M25.662)  Difficulty in walking, Not elsewhere classified (R26.2)      ASSESSMENT:     Ms. Tono Miller presents up in chair on contact and eager for PT. She wants to walk so she can get her catheter out. She presents with decreased strength and range of motion left lower extremity and with decreased independence with functional mobility s/p left TKA. Pt will benefit from skilled PT interventions to maximize independence with functional mobility and with TKA HEP. Pt did well with assessment and walked 100 feet. Worked on her TKA exercises with verbal cues. Stayed up in chair with ice on knee and needs in reach.  Hope to progress at next therapy session. This section established at most recent assessment   PROBLEM LIST (Impairments causing functional limitations):  Decreased Strength  Decreased ADL/Functional Activities  Decreased Transfer Abilities  Decreased Ambulation Ability/Technique  Decreased Flexibility/Joint Mobility  Edema/Girth  Decreased [place] with Home Exercise Program   INTERVENTIONS PLANNED: (Benefits and precautions of physical therapy have been discussed with the patient.)  Cold  bed mobility  gait training  home exercise program (HEP)  Range of Motion: active/assisted/passive  Therapeutic Activities  therapeutic exercise/strengthening  transfer training  Group Therapy     TREATMENT PLAN: Frequency/Duration: Follow patient BID for duration of hospital stay to address above goals. Rehabilitation Potential For Stated Goals: Good     RECOMMENDED REHABILITATION/EQUIPMENT: (at time of discharge pending progress): Continue Skilled Therapy, Home Health: Physical Therapy and Outpatient: Physical Therapy. HISTORY:   History of Present Injury/Illness (Reason for Referral):  Pt s/p left TKA on 6/18/19  Past Medical History/Comorbidities:   Ms. Nicole Rivera  has a past medical history of Arthritis, HDL deficiency, Hypertension, and Rheumatic fever. She also has no past medical history of Coagulation defects, COPD, or Unspecified adverse effect of anesthesia. Ms. Nicole Rivera  has a past surgical history that includes hx knee arthroscopy (Right, 2011); hx breast reduction (1994); hx tonsillectomy; hx appendectomy (2009); hx abdominoplasty (1996); and hx hysteroscopy (12/21/2017).   Social History/Living Environment:   Home Environment: Private residence  # Steps to Enter: 0  One/Two Story Residence: One story  Living Alone: No  Support Systems: Spouse/Significant Other/Partner  Patient Expects to be Discharged to[de-identified] Private residence  Current DME Used/Available at Home: Cane, straight  Tub or Shower Type: Shower  Prior Level of Function/Work/Activity:  Pt lives at home with her spouse, independent with gait and ADLs without assistive devices   Number of Personal Factors/Comorbidities that affect the Plan of Care: 0: LOW COMPLEXITY   EXAMINATION:   Most Recent Physical Functioning:   Gross Assessment: Yes  Gross Assessment  AROM: Within functional limits(except left lower extremity s/p TKA)  Strength: Within functional limits(except left lower extremity s/p TKA)                     Bed Mobility  Supine to Sit: (up in chair on contact)  Sit to Supine: (stayed up in chair)    Transfers  Sit to Stand: Stand-by assistance;Contact guard assistance  Stand to Sit: Stand-by assistance;Contact guard assistance    Balance  Sitting: Intact  Standing: Pull to stand; With support    Posture  Posture (WDL): Within defined limits         Weight Bearing Status  Left Side Weight Bearing: As tolerated  Distance (ft): 100 Feet (ft)  Ambulation - Level of Assistance: Contact guard assistance;Minimal assistance  Assistive Device: Walker, rolling  Speed/Audelia: Pace decreased (<100 feet/min)  Step Length: Right shortened  Stance: Left decreased  Gait Abnormalities: Antalgic        Braces/Orthotics: none    Left Knee Cold  Type: Cryocuff  Patient Position: Sitting      Body Structures Involved:  Joints  Muscles Body Functions Affected: Movement Related Activities and Participation Affected: Mobility  Self Care   Number of elements that affect the Plan of Care: 4+: HIGH COMPLEXITY   CLINICAL PRESENTATION:   Presentation: Stable and uncomplicated: LOW COMPLEXITY   CLINICAL DECISION MAKIN [address]? ?6 Clicks? Basic Mobility Inpatient Short Form  How much difficulty does the patient currently have. .. Unable A Lot A Little None   1. Turning over in bed (including adjusting bedclothes, sheets and blankets)? ? 1   ? 2   ? 3   ? 4   2. Sitting down on and standing up from a chair with arms ( e.g., wheelchair, bedside commode, etc.)   ?  1   ? 2 ? 3   ? 4   3. Moving from lying on back to sitting on the side of the bed?   ? 1   ? 2   ? 3   ? 4   How much help from another person does the patient currently need. .. Total A Lot A Little None   4. Moving to and from a bed to a chair (including a wheelchair)? ? 1   ? 2   ? 3   ? 4   5. Need to walk in hospital room? ? 1   ? 2   ? 3   ? 4   6. Climbing 3-5 steps with a railing? ? 1   ? 2   ? 3   ? 4   © 2007, Trustees of [facility] [employer], under license to [employer]. All rights reserved     Score:  Initial: 18 Most Recent: X (Date: -- )    Interpretation of Tool:  Represents activities that are increasingly more difficult (i.e. Bed mobility, Transfers, Gait). Medical Necessity:     Patient is expected to demonstrate progress in strength, range of motion and functional technique   to decrease assistance required with functional mobility and TKA exercises   . Reason for Services/Other Comments:  Patient continues to require skilled intervention due to inability to complete functional mobility and TKA Exercises independently   . Use of outcome tool(s) and clinical judgement create a POC that gives a: Clear prediction of patient's progress: LOW COMPLEXITY            TREATMENT:   (In addition to Assessment/Re-Assessment sessions the following treatments were rendered)     Pre-treatment Symptoms/Complaints:  none  Pain Initial:   Pain Intensity 1: 0  Post Session:  0/10     Therapeutic Exercise: (10 Minutes):  Exercises per grid below to improve mobility and strength. Required minimal verbal cues to promote proper body alignment and promote proper body posture. Progressed repetitions as indicated. Date:  6/18/19 Date:   Date:     ACTIVITY/EXERCISE AM PM AM PM AM PM   GROUP THERAPY  ?  ?  ?  ?  ?  ?    Ankle Pumps  10       Quad Sets  10       Gluteal Sets  10       Hip ABd/ADduction  10       Straight Leg Raises  10       Knee Slides  10       Short Arc Quads         Long Arc Quads Chair Slides                  B = bilateral; AA = active assistive; A = active; P = passive      Treatment/Session Assessment:     Response to Treatment:  pt tolerated well and RN took catheter out. Education:  ? Home Exercises  ? Fall Precautions  ? no pillow under knee ? D/C Instruction Review  ? Knee Prosthesis Review  ? Walker Management/Safety ? Adaptive Equipment as Needed       Interdisciplinary Collaboration:   Registered Nurse  Rehabilitation Attendant    After treatment position/precautions:   Up in chair  Bed/Chair-wheels locked  Call light within reach    Compliance with Program/Exercises: Compliant all of the time. Recommendations/Intent for next treatment session:  Treatment next visit will focus on increasing Ms. Villaseñor's independence with bed mobility, transfers, gait training, strength/ROM exercises, modalities for pain, and patient education.       Total Treatment Duration:  PT Patient Time In/Time Out  Time In: 0603  Time Out: [address], PT

## 2019-06-18 NOTE — PROGRESS NOTES
06/18/19 1459   Oxygen Therapy   O2 Sat (%) 98 %   Pulse via Oximetry 68 beats per minute   O2 Device Room air   O2 Flow Rate (L/min) 0 l/min   Patient achieved   1500   Ml/sec on IS. Patient encouraged to do every hour while awake-patient agreed and demonstrated. No shortness of breath or distress noted. BS are clear b/l.    [facility] Camp notes reviewed- continuous sat # 05 ordered HS

## 2019-06-18 NOTE — ANESTHESIA PROCEDURE NOTES
Spinal Block    Start time: 6/18/2019 11:31 AM  End time: 6/18/2019 11:34 AM  Performed by: Dwight Tello MD  Authorized by: Dwight Tello MD     Pre-procedure:   Indications: at surgeon's request and primary anesthetic  Preanesthetic Checklist: patient identified, risks and benefits discussed, anesthesia consent, site marked, patient being monitored and timeout performed    Timeout Time: 11:30          Spinal Block:   Patient Position:  Seated  Prep Region:  Lumbar  Prep: DuraPrep      Location:  L2-3  Technique:  Single shot    Local Dose (mL):  3    Needle:   Needle Type:  Quincke  Needle Gauge:  22 G  Attempts:  1      Events: CSF confirmed, no blood with aspiration and no paresthesia        Assessment:  Insertion:  Uncomplicated  Patient tolerance:  Patient tolerated the procedure well with no immediate complications

## 2019-06-19 VITALS
SYSTOLIC BLOOD PRESSURE: 125 MMHG | WEIGHT: 188.13 LBS | BODY MASS INDEX: 36.93 KG/M2 | OXYGEN SATURATION: 98 % | HEART RATE: 65 BPM | HEIGHT: 60 IN | TEMPERATURE: 97.6 F | RESPIRATION RATE: 16 BRPM | DIASTOLIC BLOOD PRESSURE: 73 MMHG

## 2019-06-19 PROBLEM — Z96.652 S/P TKR (TOTAL KNEE REPLACEMENT) USING CEMENT, LEFT: Status: ACTIVE | Noted: 2019-06-19

## 2019-06-19 PROCEDURE — 97535 SELF CARE MNGMENT TRAINING: CPT

## 2019-06-19 PROCEDURE — 74011250637 HC RX REV CODE- 250/637: Performed by: ORTHOPAEDIC SURGERY

## 2019-06-19 PROCEDURE — 97116 GAIT TRAINING THERAPY: CPT

## 2019-06-19 PROCEDURE — 97150 GROUP THERAPEUTIC PROCEDURES: CPT

## 2019-06-19 PROCEDURE — 94760 N-INVAS EAR/PLS OXIMETRY 1: CPT

## 2019-06-19 PROCEDURE — 97110 THERAPEUTIC EXERCISES: CPT

## 2019-06-19 PROCEDURE — 74011250636 HC RX REV CODE- 250/636: Performed by: ORTHOPAEDIC SURGERY

## 2019-06-19 RX ORDER — ASPIRIN 81 MG/1
81 TABLET ORAL EVERY 12 HOURS
Qty: 60 TAB | Refills: 0 | Status: SHIPPED | OUTPATIENT
Start: 2019-06-19 | End: 2019-07-19

## 2019-06-19 RX ORDER — OXYCODONE HYDROCHLORIDE 5 MG/1
5-10 TABLET ORAL
Qty: 60 TAB | Refills: 0 | Status: SHIPPED | OUTPATIENT
Start: 2019-06-19 | End: 2019-06-26

## 2019-06-19 RX ADMIN — ACYCLOVIR 400 MG: 800 TABLET ORAL at 09:32

## 2019-06-19 RX ADMIN — LOSARTAN POTASSIUM 100 MG: 50 TABLET ORAL at 09:32

## 2019-06-19 RX ADMIN — Medication 1 AMPULE: at 09:34

## 2019-06-19 RX ADMIN — SENNOSIDES AND DOCUSATE SODIUM 2 TABLET: 8.6; 5 TABLET ORAL at 09:32

## 2019-06-19 RX ADMIN — OXYCODONE HYDROCHLORIDE 10 MG: 5 TABLET ORAL at 04:50

## 2019-06-19 RX ADMIN — DEXAMETHASONE SODIUM PHOSPHATE 10 MG: 10 INJECTION INTRAMUSCULAR; INTRAVENOUS at 12:46

## 2019-06-19 RX ADMIN — Medication 10 ML: at 12:46

## 2019-06-19 RX ADMIN — OXYCODONE HYDROCHLORIDE 10 MG: 5 TABLET ORAL at 09:32

## 2019-06-19 RX ADMIN — ACETAMINOPHEN 1000 MG: 500 TABLET, FILM COATED ORAL at 09:35

## 2019-06-19 RX ADMIN — ACETAMINOPHEN 1000 MG: 500 TABLET, FILM COATED ORAL at 00:13

## 2019-06-19 RX ADMIN — OXYCODONE HYDROCHLORIDE 10 MG: 5 TABLET ORAL at 12:46

## 2019-06-19 RX ADMIN — CELECOXIB 200 MG: 200 CAPSULE ORAL at 09:33

## 2019-06-19 RX ADMIN — OXYCODONE HYDROCHLORIDE 10 MG: 5 TABLET ORAL at 00:13

## 2019-06-19 RX ADMIN — Medication 2 G: at 04:45

## 2019-06-19 RX ADMIN — ASPIRIN 81 MG: 81 TABLET ORAL at 09:33

## 2019-06-19 RX ADMIN — HYDROMORPHONE HYDROCHLORIDE 1 MG: 2 INJECTION INTRAMUSCULAR; INTRAVENOUS; SUBCUTANEOUS at 02:14

## 2019-06-19 NOTE — PROGRESS NOTES
Problem: Mobility Impaired (Adult and Pediatric)  Goal: *Acute Goals and Plan of Care (Insert Text)  Description  GOALS (1-4 days):  (1.)Ms. Zahra Head will move from supine to sit and sit to supine  in bed with SUPERVISION. (2.)Ms. Zahra Head will transfer from bed to chair and chair to bed with SUPERVISION using the least restrictive device. (3.)Ms. Villaseñor will ambulate with STAND BY ASSIST for 300 feet with the least restrictive device. (4.)Ms. Zahra Head will ambulate up/down 3 steps with bilateral  railing with CONTACT GUARD ASSIST with no device. (5.)Ms. Zahra Head will increase left knee ROM to 5°-80°.  ________________________________________________________________________________________________     Outcome: Progressing Towards Goal     PHYSICAL THERAPY JOINT CAMP TKA: Daily Note, Treatment Day: 1st and AM 6/19/2019  INPATIENT: Hospital Day: 2  Payor: Nita Meyer / [place] / Product Type: SUSAN /      NAME/AGE/GENDER: Mely Rayo is a 49 y.o. female   PRIMARY DIAGNOSIS:  Unilateral primary osteoarthritis, left knee [M17.12]   Procedure(s) and Anesthesia Type:     * LEFT TOTAL KNEE ARTHROPLASTY   - Spinal (Left)  ICD-10: Treatment Diagnosis:    · Pain in Left Knee (M25.562)  · Stiffness of Left Knee, Not elsewhere classified (M25.662)  · Difficulty in walking, Not elsewhere classified (R26.2)      ASSESSMENT:     Ms. Zahra Head is standing up walking around room on arrival. She is motivated to move and participate with PT. She ambulated into the restroom to void and then down the hallway with SBA to supervision. Exercises performed sitting up in chair. Pt left with needs in reach. This section established at most recent assessment   PROBLEM LIST (Impairments causing functional limitations):  1. Decreased Strength  2. Decreased ADL/Functional Activities  3. Decreased Transfer Abilities  4. Decreased Ambulation Ability/Technique  5.  Decreased Flexibility/Joint Mobility  6. Edema/Girth  7. Decreased [place] with Home Exercise Program   INTERVENTIONS PLANNED: (Benefits and precautions of physical therapy have been discussed with the patient.)  1. Cold  2. bed mobility  3. gait training  4. home exercise program (HEP)  5. Range of Motion: active/assisted/passive  6. Therapeutic Activities  7. therapeutic exercise/strengthening  8. transfer training  9. Group Therapy     TREATMENT PLAN: Frequency/Duration: Follow patient BID for duration of hospital stay to address above goals. Rehabilitation Potential For Stated Goals: Good     RECOMMENDED REHABILITATION/EQUIPMENT: (at time of discharge pending progress): Continue Skilled Therapy, Home Health: Physical Therapy and Outpatient: Physical Therapy. HISTORY:   History of Present Injury/Illness (Reason for Referral):  Pt s/p left TKA on 6/18/19  Past Medical History/Comorbidities:   Ms. Geovanny James  has a past medical history of Arthritis, HDL deficiency, Hypertension, and Rheumatic fever. She also has no past medical history of Coagulation defects, COPD, or Unspecified adverse effect of anesthesia. Ms. Geovanny James  has a past surgical history that includes hx knee arthroscopy (Right, 2011); hx breast reduction (1994); hx tonsillectomy; hx appendectomy (2009); hx abdominoplasty (1996); and hx hysteroscopy (12/21/2017).   Social History/Living Environment:   Home Environment: Private residence  # Steps to Enter: 0  One/Two Story Residence: One story  Living Alone: No  Support Systems: Spouse/Significant Other/Partner  Patient Expects to be Discharged to[de-identified] Private residence  Current DME Used/Available at Home: Walker, rolling, Cane, straight  Tub or Shower Type: Shower  Prior Level of Function/Work/Activity:  Pt lives at home with her spouse, independent with gait and ADLs without assistive devices   Number of Personal Factors/Comorbidities that affect the Plan of Care: 0: LOW COMPLEXITY   EXAMINATION:   Most Recent Physical Functioning:                                 Transfers  Sit to Stand: Supervision  Stand to Sit: Supervision  Bed to Chair: Supervision    Balance  Sitting: Intact  Standing: With support              Weight Bearing Status  Left Side Weight Bearing: As tolerated  Distance (ft): 120 Feet (ft)  Ambulation - Level of Assistance: Stand-by assistance  Assistive Device: Walker, rolling  Speed/Audelia: Delayed;Pace decreased (<100 feet/min)  Step Length: Left shortened;Right shortened  Stance: Left decreased  Gait Abnormalities: Antalgic;Decreased step clearance        Braces/Orthotics: none    Left Knee Cold  Type: Cryocuff      Body Structures Involved:  1. Joints  2. Muscles Body Functions Affected:  1. Movement Related Activities and Participation Affected:  1. Mobility  2. Self Care   Number of elements that affect the Plan of Care: 4+: HIGH COMPLEXITY   CLINICAL PRESENTATION:   Presentation: Stable and uncomplicated: LOW COMPLEXITY   CLINICAL DECISION MAKIN[address] [place] [id number] AM-PAC 6 Clicks   Basic Mobility Inpatient Short Form  How much difficulty does the patient currently have. .. Unable A Lot A Little None   1. Turning over in bed (including adjusting bedclothes, sheets and blankets)? ? 1   ? 2   ? 3   ? 4   2. Sitting down on and standing up from a chair with arms ( e.g., wheelchair, bedside commode, etc.)   ? 1   ? 2   ? 3   ? 4   3. Moving from lying on back to sitting on the side of the bed?   ? 1   ? 2   ? 3   ? 4   How much help from another person does the patient currently need. .. Total A Lot A Little None   4. Moving to and from a bed to a chair (including a wheelchair)? ? 1   ? 2   ? 3   ? 4   5. Need to walk in hospital room? ? 1   ? 2   ? 3   ? 4   6. Climbing 3-5 steps with a railing? ? 1   ? 2   ? 3   ? 4   © 2007, Trustees of [address] [place] [id number], under license to [employer].  All rights reserved     Score:  Initial: 18 Most Recent: X (Date: -- )    Interpretation of Tool: Represents activities that are increasingly more difficult (i.e. Bed mobility, Transfers, Gait). Medical Necessity:     · Patient is expected to demonstrate progress in strength, range of motion and functional technique  ·  to decrease assistance required with functional mobility and TKA exercises   · . Reason for Services/Other Comments:  · Patient continues to require skilled intervention due to inability to complete functional mobility and TKA Exercises independently   · . Use of outcome tool(s) and clinical judgement create a POC that gives a: Clear prediction of patient's progress: LOW COMPLEXITY            TREATMENT:   (In addition to Assessment/Re-Assessment sessions the following treatments were rendered)     Pre-treatment Symptoms/Complaints:  none  Pain Initial:      Post Session:  0/10     Therapeutic Exercise: (15 Minutes):  Exercises per grid below to improve mobility and strength. Required minimal verbal cues to promote proper body alignment and promote proper body posture. Progressed repetitions as indicated. Gait Training (10 Minutes):  Gait training to improve and/or restore physical functioning as related to mobility, strength and balance. Ambulated 120 Feet (ft) with Stand-by assistance using a Walker, rolling and minimal   related to their stance phase and stride length to promote proper body alignment, promote proper body posture and promote proper body mechanics. Date:  6/18/19 Date:  6/19/18 Date:     ACTIVITY/EXERCISE AM PM AM PM AM PM   GROUP THERAPY  ?  ?  ?  ?  ?  ? Ankle Pumps  10 15      Quad Sets  10 15      Gluteal Sets  10 15      Hip ABd/ADduction  10 15      Straight Leg Raises  10 15      Knee Slides  10 15      Short Arc Quads         Long Arc Quads         Chair Slides                  B = bilateral; AA = active assistive; A = active; P = passive      Treatment/Session Assessment:     Response to Treatment:  Pt continues to do well with mobility and exercises. Education:  ? Home Exercises  ? Fall Precautions  ? no pillow under knee ? D/C Instruction Review  ? Knee Prosthesis Review  ? Walker Management/Safety ? Adaptive Equipment as Needed       Interdisciplinary Collaboration:   o Registered Nurse  o Rehabilitation Attendant    After treatment position/precautions:   o Up in chair  o Bed/Chair-wheels locked  o Call light within reach    Compliance with Program/Exercises: Compliant all of the time. Recommendations/Intent for next treatment session:  Treatment next visit will focus on increasing Ms. Villaseñor's independence with bed mobility, transfers, gait training, strength/ROM exercises, modalities for pain, and patient education.       Total Treatment Duration:  PT Patient Time In/Time Out  Time In: 0925  Time Out: [address], PTA

## 2019-06-19 NOTE — PROGRESS NOTES
06/18/19 2042   Oxygen Therapy   O2 Sat (%) 99 %   Pulse via Oximetry 78 beats per minute   O2 Device Room air   Patient placed on continuous sat monitor (#5) Alarms set and data cleared. No distress noted at this time.

## 2019-06-19 NOTE — DISCHARGE SUMMARY
[address] [place] [address]  Total Joint Discharge Summary      Patient ID:  Jarrett Marcos  [id number]  36 y.o.  1965    Admit date: 6/18/2019  Discharge date and time: 6/19/19  Admitting Physician: Sue King MD  Surgeon: Same  Admission Diagnoses: Unilateral primary osteoarthritis, left knee [M17.12]  Arthritis of knee, left [M17.12]  Discharge Diagnoses: Principal Problem:    S/P TKR (total knee replacement) using cement, left (6/19/2019)    Active Problems:    Arthritis of knee, left (6/18/2019)                                Perioperative Antibiotics: Ancef 1 to 2 mg was given depending on patient's weight. If allergic to Ancef or due to other indications, patient was given Vancomycin. Hospital Medications given:   [unfilled]  [unfilled]  [unfilled]    Discharge Medications given:  Current Discharge Medication List      START taking these medications    Details   oxyCODONE IR (ROXICODONE) 5 mg immediate release tablet Take 1-2 Tabs by mouth every four (4) hours as needed for Pain for up to 7 days. Max Daily Amount: 60 mg.  Qty: 60 Tab, Refills: 0    Associated Diagnoses: S/P TKR (total knee replacement) using cement, left         CONTINUE these medications which have CHANGED    Details   aspirin delayed-release 81 mg tablet Take 1 Tab by mouth every twelve (12) hours every twelve (12) hours for 30 days. Qty: 60 Tab, Refills: 0         CONTINUE these medications which have NOT CHANGED    Details   traMADol (ULTRAM) 50 mg tablet Take 50 mg by mouth every six (6) hours as needed for Pain. Indications: Pain      vit A/vit C/vit E/zinc/copper (ICAPS AREDS PO) Take 1 Cap by mouth two (2) times a day. LOSARTAN PO Take 100 mg by mouth daily. atorvastatin (LIPITOR) 10 mg tablet Take 10 mg by mouth nightly. diclofenac (VOLTAREN) 1 % gel Apply 2 g to affected area four (4) times daily.       calcium carb-vit d2-minerals (CALTRATE PLUS) Tab Take 1 Tab by mouth two (2) times a day.      acyclovir (ZOVIRAX) 400 mg tablet Take 400 mg by mouth daily. Takes in am       phentermine (ADIPEX-P) 37.5 mg tablet Take 37.5 mg by mouth every morning. STOP taking these medications       DICLOFENAC SODIUM PO Comments:   Reason for Stopping:                Additional DVT Prophylaxis:  REN Hose,Plexi-Pulse    Postoperative transfusions:   none  Post Op complications: none    Hemoglobin at discharge:   Lab Results   Component Value Date/Time    HGB 10.9 (L) 06/18/2019 07:40 PM       Wound appears to be healing without any evidence of infection. Physical Therapy started on the day following surgery and progressed to independent ambulation with the aid of a walker. At the time of discharge, able to go up and down stairs and had understanding of precautions needed following surgery.       PT/OT:            Assistive Device: Walker (comment)                Discharged to: home    Discharge instructions:  -Rx pain medication given  - Anticoagulate with: Ecotrin 81 mg PO BID x 4 weeks  -Resume pre hospital diet             -Resume home medications per medical continuation form     -Ambulate with walker, appropriate total joint protocol  -Follow up in office as scheduled       Signed:  Alexandria Thayer MD  6/19/2019  7:21 AM

## 2019-06-19 NOTE — DISCHARGE INSTRUCTIONS
Patient Education   [facility]   Patient Discharge Instructions    Tim Ortiz / [id number] : 1965    Admitted 2019 Discharged: 2019     IF YOU HAVE ANY PROBLEMS ONCE YOU ARE AT HOME CALL THE FOLLOWING NUMBERS:   Main office number: [phone]    Take Home Medications     {Medication reconciliation information is now added to the patient's AVS automatically when it is printed. There is no need to use this SmartLink in discharge instructions. Highlight this text and delete it to clear this message}    · It is important that you take the medication exactly as they are prescribed. · Keep your medication in the bottles provided by the pharmacist and keep a list of the medication names, dosages, and times to be taken in your wallet. · Do not take other medications without consulting your doctor. What to do at [address] [place] [address] Ave your prehospital diet. If you have excessive nausea or vomitting call your doctor's office     Home Physical Therapy is arranged. Use rolling walker when walking. Patients who have had a joint replacement should not drive until you are seen for your follow up appointment by Dr. Yemi Chang. When to Call    - Call if you have a temperature greater then 101  - Unable to keep food down  - Loose control of your bladder or bowel function  - Are unable to bear any weight   - Need a pain medication refill     Information obtained by :  I understand that if any problems occur once I am at home I am to contact my physician. I understand and acknowledge receipt of the instructions indicated above.                                                                                                                                            Physician's or R.N.'s Signature                                                                  Date/Time Patient or Representative Signature                                                          Date/Time           Total Knee Replacement: What to Expect at [address] [place] [address]    When you leave the hospital, you should be able to move around with a walker or crutches. But you will need someone to help you at home for the next few weeks or until you have more energy and can move around better. If there is no one to help you at home, you may go to a rehabilitation center. You will go home with a bandage and stitches, staples, tissue glue, or tape strips. Change the bandage as your doctor tells you to. If you have stitches or staples, your doctor will remove them 10 to 21 days after your surgery. Glue or tape strips will fall off on their own over time. You may still have some mild pain, and the area may be swollen for 3 to 6 months after surgery. Your knee will continue to improve for 6 to 12 months. You will probably use a walker for 1 to 3 weeks and then use crutches. When you are ready, you can use a cane. You will probably be able to walk on your own in 4 to 8 weeks. You will need to do months of physical rehabilitation (rehab) after a knee replacement. Rehab will help you strengthen the muscles of the knee and help you regain movement. After you recover, your artificial knee will allow you to do normal daily activities with less pain or no pain at all. You may be able to hike, dance, ride a bike, and play golf. Talk to your doctor about whether you can do more strenuous activities. Always tell your caregivers that you have an artificial knee. How long it will take to walk on your own, return to normal activities, and go back to work depends on your health and how well your rehabilitation (rehab) program goes. The better you do with your rehab exercises, the quicker you will get your strength and movement back.   This care sheet gives you a general idea about how long it will take for you to recover. But each person recovers at a different pace. Follow the steps below to get better as quickly as possible. How can you care for yourself at home? Activity    · Rest when you feel tired. You may take a nap, but do not stay in bed all day. When you sit, use a chair with arms. You can use the arms to help you stand up.     · Work with your physical therapist to find the best way to exercise. You may be able to take frequent, short walks using crutches or a walker. What you can do as your knee heals will depend on whether your new knee is cemented or uncemented. You may not be able to do certain things for a while if your new knee is uncemented.     · After your knee has healed enough, you can do more strenuous activities with caution. ? You can golf, but use a golf cart, and do not wear shoes with spikes. ? You can bike on a flat road or on a stationary bike. Avoid biking up hills. ? Your doctor may suggest that you stay away from activities that put stress on your knee. These include tennis or badminton, squash or racquetball, contact sports like football, jumping (such as in basketball), jogging, or running. ? Avoid activities where you might fall. These include horseback riding, skiing, and mountain biking.     · Do not sit for more than 1 hour at a time. Get up and walk around for a while before you sit again. If you must sit for a long time, prop up your leg with a chair or footstool. This will help you avoid swelling.     · Ask your doctor when you can drive again. It may take up to 8 weeks after knee replacement surgery before it is safe for you to drive.     · When you get into a car, sit on the edge of the seat. Then pull in your legs, and turn to face the front.     · You should be able to do many everyday activities 3 to 6 weeks after your surgery. You will probably need to take 4 to 16 weeks off from work.  When you can go back to work depends on the type of work you do and how you feel.     · Ask your doctor when it is okay for you to have sex.     · Do not lift anything heavier than 10 pounds and do not lift weights for 12 weeks. Diet    · By the time you leave the hospital, you should be eating your normal diet. If your stomach is upset, try bland, low-fat foods like plain rice, broiled chicken, toast, and yogurt. Your doctor may suggest that you take iron and vitamin supplements.     · Drink plenty of fluids (unless your doctor tells you not to).   · Eat healthy foods, and watch your portion sizes. Try to stay at your ideal weight. Too much weight puts more stress on your new knee.     · You may notice that your bowel movements are not regular right after your surgery. This is common. Try to avoid constipation and straining with bowel movements. You may want to take a fiber supplement every day. If you have not had a bowel movement after a couple of days, ask your doctor about taking a mild laxative. Medicines    · Your doctor will tell you if and when you can restart your medicines. He or she will also give you instructions about taking any new medicines.     · If you take blood thinners, such as warfarin (Coumadin), clopidogrel (Plavix), or aspirin, be sure to talk to your doctor. He or she will tell you if and when to start taking those medicines again. Make sure that you understand exactly what your doctor wants you to do.     · Your doctor may give you a blood-thinning medicine to prevent blood clots. If you take a blood thinner, be sure you get instructions about how to take your medicine safely. Blood thinners can cause serious bleeding problems. This medicine could be in pill form or as a shot (injection). If a shot is necessary, your doctor will tell you how to do this.     · Be safe with medicines. Take pain medicines exactly as directed. ? If the doctor gave you a prescription medicine for pain, take it as prescribed.   ? If you are not taking a prescription pain medicine, ask your doctor if you can take an over-the-counter medicine. ? Plan to take your pain medicine 30 minutes before exercises. It is easier to prevent pain before it starts than to stop it once it has started.     · If you think your pain medicine is making you sick to your stomach:  ? Take your medicine after meals (unless your doctor has told you not to). ? Ask your doctor for a different pain medicine.     · If your doctor prescribed antibiotics, take them as directed. Do not stop taking them just because you feel better. You need to take the full course of antibiotics. Incision care    · If your doctor told you how to care for your cut (incision), follow your doctor's instructions. You will have a dressing over the cut. A dressing helps the incision heal and protects it. Your doctor will tell you how to take care of this.     · If you did not get instructions, follow this general advice:  ? If you have strips of tape on the cut the doctor made, leave the tape on for a week or until it falls off.  ? If you have stitches or staples, your doctor will tell you when to come back to have them removed. ? If you have skin adhesive on the cut, leave it on until it falls off. Skin adhesive is also called glue or liquid stitches. ? Change the bandage every day. ? Wash the area daily with warm water, and pat it dry. Don't use hydrogen peroxide or alcohol. They can slow healing. ? You may cover the area with a gauze bandage if it oozes fluid or rubs against clothing. ? You may shower 24 to 48 hours after surgery. Pat the incision dry. Don't swim or take a bath for the first 2 weeks, or until your doctor tells you it is okay. Exercise    · Your rehab program will give you a number of exercises to do to help you get back your knee's range of motion and strength. Always do them as your therapist tells you. Ice and elevation    · For pain and swelling, put ice or a cold pack on the area for 10 to 20 minutes at a time. Put a thin cloth between the ice and your skin. Other instructions    · Continue to wear your support stockings as your doctor says. These help to prevent blood clots. The length of time that you will have to wear them depends on your activity level and the amount of swelling.     · You have metal pieces in your knee. These may set off some airport metal detectors. Carry a medical alert card that says you have an artificial joint, just in case. Follow-up care is a key part of your treatment and safety. Be sure to make and go to all appointments, and call your doctor if you are having problems. It's also a good idea to know your test results and keep a list of the medicines you take. When should you call for help? Call 911 anytime you think you may need emergency care. For example, call if:    · You passed out (lost consciousness).     · You have severe trouble breathing.     · You have sudden chest pain and shortness of breath, or you cough up blood.    Call your doctor now or seek immediate medical care if:    · You have signs of infection, such as:  ? Increased pain, swelling, warmth, or redness. ? Red streaks leading from the incision. ? Pus draining from the incision. ? A fever.     · You have signs of a blood clot, such as:  ? Pain in your calf, back of the knee, thigh, or groin. ? Redness and swelling in your leg or groin.     · Your incision comes open and begins to bleed, or the bleeding increases.     · You have pain that does not get better after you take pain medicine.    Watch closely for changes in your health, and be sure to contact your doctor if:    · You do not have a bowel movement after taking a laxative. Where can you learn more? Go to http://yamil-elaina.info/. Enter U479 in the search box to learn more about \"Total Knee Replacement: What to Expect at Home. \"  Current as of: September 20, 2018  Content Version: 11.9  © 2416-4675 [employer], [facility].  Care instructions adapted under license by [employer] (which disclaims liability or warranty for this information). If you have questions about a medical condition or this instruction, always ask your healthcare professional. [address] any warranty or liability for your use of this information.

## 2019-06-19 NOTE — PROGRESS NOTES
Orthopedic Joint Progress Note    2019  Admit Date: 2019  Admit Diagnosis: Unilateral primary osteoarthritis, left knee [M17.12]  Arthritis of knee, left [M17.12]    1 Day Post-Op    Subjective:     Comfort Villaseñor awake and alert    Review of Systems: Pertinent items are noted in HPI. Objective:     PT/OT:     PATIENT MOBILITY    Bed Mobility  Supine to Sit: (up in chair on contact)  Sit to Supine: (stayed up in chair)  Scooting: Stand-by assistance  Transfers  Sit to Stand: Stand-by assistance, Contact guard assistance  Stand to Sit: Stand-by assistance, Contact guard assistance  Bed to Chair: Contact guard assistance      Gait  Speed/Audelia: Pace decreased (<100 feet/min)  Step Length: Right shortened  Stance: Left decreased  Gait Abnormalities: Antalgic  Ambulation - Level of Assistance: Contact guard assistance, Minimal assistance  Distance (ft): 100 Feet (ft)  Assistive Device: Walker, rolling   Weight Bearing Status  Left Side Weight Bearing: As tolerated        Vital Signs:    Blood pressure 138/76, pulse 68, temperature 98 °F (36.7 °C), resp. rate 16, height 4' 11.5\" (1.511 m), weight 85.3 kg (188 lb 2 oz), SpO2 95 %.   Temp (24hrs), Av.9 °F (36.6 °C), Min:97.6 °F (36.4 °C), Max:98.2 °F (36.8 °C)      Pain Control:   Pain Assessment  Pain Scale 1: Numeric (0 - 10)  Pain Intensity 1: 5  Pain Onset 1: 2 YRS  Pain Location 1: Knee  Pain Orientation 1: Left  Pain Description 1: Aching    Meds:  Current Facility-Administered Medications   Medication Dose Route Frequency    tuberculin injection 5 Units  5 Units IntraDERMal ONCE    acyclovir (ZOVIRAX) tablet 400 mg  400 mg Oral DAILY    atorvastatin (LIPITOR) tablet 10 mg  10 mg Oral QHS    losartan (COZAAR) tablet 100 mg  100 mg Oral DAILY    0.9% sodium chloride infusion  100 mL/hr IntraVENous CONTINUOUS    sodium chloride (NS) flush 5-40 mL  5-40 mL IntraVENous PRN    acetaminophen (TYLENOL) tablet 1,000 mg  1,000 mg Oral Q6H  celecoxib (CELEBREX) capsule 200 mg  200 mg Oral Q12H    oxyCODONE IR (ROXICODONE) tablet 5-10 mg  5-10 mg Oral Q4H PRN    HYDROmorphone (PF) (DILAUDID) injection 1 mg  1 mg IntraVENous Q3H PRN    naloxone (NARCAN) injection 0.2-0.4 mg  0.2-0.4 mg IntraVENous Q10MIN PRN    dexamethasone (DECADRON) injection 10 mg  10 mg IntraVENous ONCE    promethazine (PHENERGAN) tablet 25 mg  25 mg Oral Q6H PRN    diphenhydrAMINE (BENADRYL) capsule 25 mg  25 mg Oral Q4H PRN    senna-docusate (PERICOLACE) 8.6-50 mg per tablet 2 Tab  2 Tab Oral DAILY    zolpidem (AMBIEN) tablet 5 mg  5 mg Oral QHS PRN    aspirin delayed-release tablet 81 mg  81 mg Oral Q12H    ondansetron (ZOFRAN ODT) tablet 8 mg  8 mg Oral Q8H PRN    alcohol 62% (NOZIN) nasal  1 Ampule  1 Ampule Topical Q12H        LAB:    Lab Results   Component Value Date/Time    INR 0.9 05/28/2019 07:40 AM    INR 0.9 01/04/2011 09:00 AM     Lab Results   Component Value Date/Time    HGB 10.9 (L) 06/18/2019 07:40 PM    HGB 12.9 05/28/2019 07:40 AM    HGB 8.4 (L) 01/14/2011 04:11 AM       Wound Knee Left (Active)   Dressing Status Clean, dry, and intact 6/18/2019  7:45 PM   Dressing Type Aquacel 6/18/2019  7:45 PM   Drainage Amount None 6/18/2019  7:45 PM   Number of days: 1         Physical Exam:  Calves soft/ neuro intact      Assessment:      Principal Problem:    S/P TKR (total knee replacement) using cement, left (6/19/2019)    Active Problems:    Arthritis of knee, left (6/18/2019)         Plan:     Continue PT/OT/Rehab  Consult: Rehab team including PT, OT, recreational therapy, and    Home soon    Patient Expects to be Discharged to[de-identified] Private residence

## 2019-06-19 NOTE — PROGRESS NOTES
Care Management Interventions  PCP Verified by CM: Yes  Mode of Transport at Discharge: Self  Transition of Care Consult (CM Consult): [address]: Yes  Discharge Durable Medical Equipment: Yes  Physical Therapy Consult: Yes  Occupational Therapy Consult: Yes  Current Support Network: Lives with Spouse  Confirm Follow Up Transport: Family  Plan discussed with Pt/Family/Caregiver: Yes  Freedom of Choice Offered: Yes  Discharge Location  Discharge Placement: Home with home health  Patient is a 49y.o. year old female admitted for Left TKA . Patient lives with Her spouse and plans to return home on discharge. Order received to arrange home health. Patient without preference towards agency. Referral sent to [facility]. Patient requesting we arrange a walker. Referral sent to [facility] who will deliver to the hospital room prior to discharge. Will follow until discharge.

## 2019-06-19 NOTE — PROGRESS NOTES
Pt resting in the recliner and   at the bedside. Dressing to left knee clean dry and intact. NV status WNL's with strong dorsiflexion and plantarflexion. Rating her pain  3/10,  10 mg oxycodone given PO. Pt alert and oriented and knows to call when  She is ready to get back into bed.

## 2019-06-19 NOTE — PROGRESS NOTES
Problem: Self Care Deficits Care Plan (Adult)  Goal: *Acute Goals and Plan of Care (Insert Text)  Description  GOALS:   DISCHARGE GOALS (in preparation for going home/rehab):  3 days  1. Ms. Yulisa Wilkins will perform one lower body dressing activity with minimal assistance required to demonstrate improved functional mobility and safety. -GOAL MET 6/19/2019   2. Ms. Yulisa Wilkins will perform one lower body bathing activity with minimal assistance required to demonstrate improved functional mobility and safety. -GOAL MET 6/19/2019   3. Ms. Yulisa Wilkins will perform toileting/toilet transfer with contact guard assistance to demonstrate improved functional mobility and safety. -GOAL MET 6/19/2019   4. Ms. Yulisa Wilkins will perform shower transfer with contact guard assistance to demonstrate improved functional mobility and safety. -GOAL MET 6/19/2019          JOINT CAMP OCCUPATIONAL THERAPY TKA: Daily Note and Discharge 6/19/2019  INPATIENT: Hospital Day: 2  Payor: Sheryl Slade / [place] BLUE [place]ENTIAL[place] SUSAN / Product Type: SUSAN /      NAME/AGE/GENDER: Audrey Gross is a 49 y.o. female   PRIMARY DIAGNOSIS:  Unilateral primary osteoarthritis, left knee [M17.12]   Procedure(s) and Anesthesia Type:     * LEFT TOTAL KNEE ARTHROPLASTY   - Spinal (Left)  ICD-10: Treatment Diagnosis:    · Pain in Left Knee (M25.562)  · Stiffness of Left Knee, Not elsewhere classified (G20.720)      ASSESSMENT:      Ms. Yulisa Wilkins is s/p Left TKA and presents with decreased weight bearing on L LE and decreased independence with functional mobility and activities of daily living. Patient completed shower and dressing as charter below in ADL grid and is ambulating with rolling walker and supervision assist.  Patient has met 4/4 goals and plans to return home with good family support. Will do well at home for ADL's. D/C OT for acute deficits.          This section established at most recent assessment   PROBLEM LIST (Impairments causing functional limitations):  1. Decreased Strength  2. Decreased ADL/Functional Activities  3. Decreased Transfer Abilities  4. Increased Pain  5. Increased Fatigue  6. Decreased Flexibility/Joint Mobility  7. Decreased Knowledge of Precautions   INTERVENTIONS PLANNED: (Benefits and precautions of occupational therapy have been discussed with the patient.)  1. Activities of daily living training  2. Adaptive equipment training  3. Balance training  4. Clothing management  5. Donning&doffing training  6. Theraputic activity     TREATMENT PLAN: Frequency/Duration: Follow patient 1-2tx to address above goals. Rehabilitation Potential For Stated Goals: Excellent     RECOMMENDED REHABILITATION/EQUIPMENT: (at time of discharge pending progress): Continue Skilled Therapy. OCCUPATIONAL PROFILE AND HISTORY:   History of Present Injury/Illness (Reason for Referral): Pt presents this date s/p (left) TKA. Past Medical History/Comorbidities:   Ms. Leona Goldstein  has a past medical history of Arthritis, HDL deficiency, Hypertension, and Rheumatic fever. She also has no past medical history of Coagulation defects, COPD, or Unspecified adverse effect of anesthesia. Ms. Leona Goldstein  has a past surgical history that includes hx knee arthroscopy (Right, 2011); hx breast reduction (1994); hx tonsillectomy; hx appendectomy (2009); hx abdominoplasty (1996); and hx hysteroscopy (12/21/2017). Social History/Living Environment:   Home Environment: Private residence  # Steps to Enter: 0  One/Two Story Residence: One story  Living Alone: No  Support Systems: Spouse/Significant Other/Partner  Patient Expects to be Discharged to[de-identified] Private residence  Current DME Used/Available at Home: Walker, rolling, Cane, straight  Tub or Shower Type: Shower  Prior Level of Function/Work/Activity:  Independent prior.       Number of Personal Factors/Comorbidities that affect the Plan of Care: Brief history (0):  LOW COMPLEXITY   ASSESSMENT OF OCCUPATIONAL PERFORMANCE[de-identified]   Most Recent Physical Functioning:   Balance  Sitting: Intact  Standing: With support                    Coordination  Fine Motor Skills-Upper: Left Intact; Right Intact  Gross Motor Skills-Upper: Left Intact; Right Intact         Mental Status  Neurologic State: Alert  Orientation Level: Oriented X4  Cognition: Appropriate decision making  Perception: Appears intact  Perseveration: No perseveration noted                Basic ADLs (From Assessment) Complex ADLs (From Assessment)   Basic ADL  Feeding: Independent  Oral Facial Hygiene/Grooming: Independent  Bathing: Supervision  Type of Bath: Chlorhexidine (CHG), Full, Shower  Upper Body Dressing: Independent  Lower Body Dressing: Supervision  Toileting: Supervision     Grooming/Bathing/Dressing Activities of Daily Living   Grooming  Grooming Assistance: Independent     Upper Body Bathing  Bathing Assistance: Independent     Lower Body Bathing  Bathing Assistance: Supervision     Upper Body Dressing Assistance  Dressing Assistance: Independent Functional Transfers  Bathroom Mobility: Supervision/set up  Toilet Transfer : Supervision  Shower Transfer: Supervision   Lower Body Dressing Assistance  Dressing Assistance: Supervision  Underpants: Supervision  Pants With Elastic Waist: Supervision  Socks: Supervision  Shoes with Cloth Laces: Supervision Bed/Mat Mobility  Sit to Stand: Supervision  Stand to Sit: Supervision  Bed to Chair: Supervision         Physical Skills Involved:  1. Range of Motion  2. Balance  3. Strength Cognitive Skills Affected (resulting in the inability to perform in a timely and safe manner):  1. [facility]  Psychosocial Skills Affected:  1. WFL    Number of elements that affect the Plan of Care: 1-3:  LOW COMPLEXITY   CLINICAL DECISION MAKING:   [employer] AM-PAC 6 Clicks   Daily Activity Inpatient Short Form  How much help from another person does the patient currently need. .. Total A Lot A Little None   1.   Putting on and taking off regular lower body clothing? ? 1    2   x? 3   ? 4   2. Bathing (including washing, rinsing, drying)? ? 1    2   x? 3   ? 4   3. Toileting, which includes using toilet, bedpan or urinal?   ? 1    2   x? 3   ? 4   4. Putting on and taking off regular upper body clothing? ? 1   ? 2   ? 3   ? 4   5. Taking care of personal grooming such as brushing teeth? ? 1   ? 2   ? 3   ? 4   6. Eating meals? ? 1   ? 2   ? 3   ? 4   © 2007, Trustees of [facility] [place] [employer], under license to [employer]. All rights reserved     Score:  Initial: 18 Most Recent: 21 (Date: 6/19/2019 )    Interpretation of Tool:  Represents activities that are increasingly more difficult (i.e. Bed mobility, Transfers, Gait). Medical Necessity:     · Skilled intervention continues to be required due to Deficits listed above. Reason for Services/Other Comments:  · Patient continues to require skilled intervention due to new TKA   · . Use of outcome tool(s) and clinical judgement create a POC that gives a: MODERATE COMPLEXITY            TREATMENT:   (In addition to Assessment/Re-Assessment sessions the following treatments were rendered)     Pre-treatment Symptoms/Complaints:    Pain: Initial:   Pain Intensity 1: 3  Post Session:  3     Self Care: (40): Procedure(s) (per grid) utilized to improve and/or restore self-care/home management as related to dressing, bathing, toileting and grooming. Required minimal verbal and tactile cueing to facilitate activities of daily living skills. Treatment/Session Assessment:     Response to Treatment:  Good, sitting up in recliner. Education:  ? Home Exercises  ? Fall Precautions  ? Hip Precautions ? Going Home Video  ? Knee/Hip Prosthesis Review  ? Walker Management/Safety ?  Adaptive Equipment as Needed       Interdisciplinary Collaboration:   o Physical Therapist  o Occupational Therapist  o Registered Nurse    After treatment position/precautions:   o Up in chair  o Bed/Chair-wheels locked  o Caregiver at bedside  o Call light within reach  o RN notified     Compliance with Program/Exercises: Compliant all of the time, Will assess as treatment progresses. Recommendations/Intent for next treatment session:  D/C OT for acute deficits.       Total Treatment Duration:  OT Patient Time In/Time Out  Time In: 6833  Time Out: Bety 35, OT

## 2019-06-19 NOTE — PROGRESS NOTES
[address]  Face to Face Encounter    Patients Name: Didier Bauer    YOB: 1965    Ordering Physician: rEika Saravia    Primary Diagnosis: Unilateral primary osteoarthritis, left knee [M17.12]  Arthritis of knee, left [M17.12]  S/p left TKA    Date of Face to Face:   6/18/2019                                  Face to Face Encounter findings are related to primary reason for home care:   yes. 1. I certify that the patient needs intermittent care as follows: physical therapy: gait/stair training    2. I certify that this patient is homebound, that is: 1) patient requires the use of a walker device, special transportation, or assistance of another to leave the home; or 2) patient's condition makes leaving the home medically contraindicated; and 3) patient has a normal inability to leave the home and leaving the home requires considerable and taxing effort. Patient may leave the home for infrequent and short duration for medical reasons, and occasional absences for non-medical reasons. Homebound status is due to the following functional limitations: Patient's ambulation limited secondary to severe pain and requires the use of an assistive device and the assistance of a caregiver for safe completion. Patient with strength and ROM deficits limiting ambulation endurance requiring the use of an assistive device and the assistance of a caregiver. Patient deemed temporarily homebound secondary to increased risk for infection when leaving home and going out into the community. 3. I certify that this patient is under my care and that I, or a nurse practitioner or  [id number], or clinical nurse specialist, or certified nurse midwife, working with me, had a Face-to-Face Encounter that meets the physician Face-to-Face Encounter requirements.   The following are the clinical findings from the [address] [place] [address] encounter that support the need for skilled services and is a summary of the encounter: see hospital chart      Jovanyazul He, BSW  6/18/2019      THE FOLLOWING TO BE COMPLETED BY THE COMMUNITY PHYSICIAN:    I concur with the findings described above from the F2F encounter that this patient is homebound and in need of a skilled service.     Certifying Physician: _____________________________________      Printed Certifying Physician Name: _____________________________________    Date: _________________

## 2019-06-19 NOTE — PROGRESS NOTES
Problem: Mobility Impaired (Adult and Pediatric)  Goal: *Acute Goals and Plan of Care (Insert Text)  Description  GOALS (1-4 days):  (1.)Ms. Yulisa Wilkins will move from supine to sit and sit to supine  in bed with SUPERVISION. (2.)Ms. Yulisa Wilkins will transfer from bed to chair and chair to bed with SUPERVISION using the least restrictive device. (3.)Ms. Villaseñor will ambulate with STAND BY ASSIST for 300 feet with the least restrictive device. (4.)Ms. Yulisa Wilkins will ambulate up/down 3 steps with bilateral  railing with CONTACT GUARD ASSIST with no device. (5.)Ms. Yulisa Wilkins will increase left knee ROM to 5°-80°.  ________________________________________________________________________________________________     Outcome: Progressing Towards Goal     PHYSICAL THERAPY JOINT CAMP TKA: Daily Note, Treatment Day: 1st and PM 6/19/2019  INPATIENT: Hospital Day: 2  Payor: BLUE CROSS / [place] SUSAN / Product Type: SUSAN /      NAME/AGE/GENDER: Audrey Gross is a 49 y.o. female   PRIMARY DIAGNOSIS:  Unilateral primary osteoarthritis, left knee [M17.12]   Procedure(s) and Anesthesia Type:     * LEFT TOTAL KNEE ARTHROPLASTY   - Spinal (Left)  ICD-10: Treatment Diagnosis:    · Pain in Left Knee (M25.562)  · Stiffness of Left Knee, Not elsewhere classified (M25.662)  · Difficulty in walking, Not elsewhere classified (R26.2)      ASSESSMENT:     Ms. Yulisa Wilkins is sitting up in chair on arrival. She is agreeable to PT and plans to go home today after eating lunch. She is moving well and we discussed her pain block wearing off tonight. This section established at most recent assessment   PROBLEM LIST (Impairments causing functional limitations):  1. Decreased Strength  2. Decreased ADL/Functional Activities  3. Decreased Transfer Abilities  4. Decreased Ambulation Ability/Technique  5. Decreased Flexibility/Joint Mobility  6. Edema/Girth  7.  Decreased [place] with Home Exercise Program   INTERVENTIONS PLANNED: (Benefits and precautions of physical therapy have been discussed with the patient.)  1. Cold  2. bed mobility  3. gait training  4. home exercise program (HEP)  5. Range of Motion: active/assisted/passive  6. Therapeutic Activities  7. therapeutic exercise/strengthening  8. transfer training  9. Group Therapy     TREATMENT PLAN: Frequency/Duration: Follow patient BID for duration of hospital stay to address above goals. Rehabilitation Potential For Stated Goals: Good     RECOMMENDED REHABILITATION/EQUIPMENT: (at time of discharge pending progress): Continue Skilled Therapy, Home Health: Physical Therapy and Outpatient: Physical Therapy. HISTORY:   History of Present Injury/Illness (Reason for Referral):  Pt s/p left TKA on 6/18/19  Past Medical History/Comorbidities:   Ms. Kayla Byers  has a past medical history of Arthritis, HDL deficiency, Hypertension, and Rheumatic fever. She also has no past medical history of Coagulation defects, COPD, or Unspecified adverse effect of anesthesia. Ms. Kayla Byers  has a past surgical history that includes hx knee arthroscopy (Right, 2011); hx breast reduction (1994); hx tonsillectomy; hx appendectomy (2009); hx abdominoplasty (1996); and hx hysteroscopy (12/21/2017).   Social History/Living Environment:   Home Environment: Private residence  # Steps to Enter: 0  One/Two Story Residence: One story  Living Alone: No  Support Systems: Spouse/Significant Other/Partner  Patient Expects to be Discharged to[de-identified] Private residence  Current DME Used/Available at Home: Walker, rolling, [place], straight  Tub or Shower Type: Shower  Prior Level of Function/Work/Activity:  Pt lives at home with her spouse, independent with gait and ADLs without assistive devices   Number of Personal Factors/Comorbidities that affect the Plan of Care: 0: LOW COMPLEXITY   EXAMINATION:   Most Recent Physical Functioning:                 LLE AROM  L Knee Flexion: 90  L Knee Extension: 3 Transfers  Sit to Stand: Supervision  Stand to Sit: Supervision  Bed to Chair: Supervision    Balance  Sitting: Intact  Standing: With support              Weight Bearing Status  Left Side Weight Bearing: As tolerated  Distance (ft): 270 Feet (ft)  Ambulation - Level of Assistance: Supervision;Stand-by assistance  Assistive Device: Walker, rolling  Speed/Audelia: Delayed;Pace decreased (<100 feet/min)  Step Length: Left shortened;Right shortened  Stance: Left decreased  Gait Abnormalities: Antalgic;Decreased step clearance        Braces/Orthotics: none    Left Knee Cold  Type: Cryocuff      Body Structures Involved:  1. Joints  2. Muscles Body Functions Affected:  1. Movement Related Activities and Participation Affected:  1. Mobility  2. Self Care   Number of elements that affect the Plan of Care: 4+: HIGH COMPLEXITY   CLINICAL PRESENTATION:   Presentation: Stable and uncomplicated: LOW COMPLEXITY   CLINICAL DECISION MAKIN[address] [place] [id number] AM-PAC 6 Clicks   Basic Mobility Inpatient Short Form  How much difficulty does the patient currently have. .. Unable A Lot A Little None   1. Turning over in bed (including adjusting bedclothes, sheets and blankets)? ? 1   ? 2   ? 3   ? 4   2. Sitting down on and standing up from a chair with arms ( e.g., wheelchair, bedside commode, etc.)   ? 1   ? 2   ? 3   ? 4   3. Moving from lying on back to sitting on the side of the bed?   ? 1   ? 2   ? 3   ? 4   How much help from another person does the patient currently need. .. Total A Lot A Little None   4. Moving to and from a bed to a chair (including a wheelchair)? ? 1   ? 2   ? 3   ? 4   5. Need to walk in hospital room? ? 1   ? 2   ? 3   ? 4   6. Climbing 3-5 steps with a railing? ? 1   ? 2   ? 3   ? 4   © 2007, Trustees of [address] [place] [id number], under license to [employer]  All rights reserved     Score:  Initial: 18 Most Recent: X (Date: -- )    Interpretation of Tool:  Represents activities that are increasingly more difficult (i.e. Bed mobility, Transfers, Gait). Medical Necessity:     · Patient is expected to demonstrate progress in strength, range of motion and functional technique  ·  to decrease assistance required with functional mobility and TKA exercises   · . Reason for Services/Other Comments:  · Patient continues to require skilled intervention due to inability to complete functional mobility and TKA Exercises independently   · . Use of outcome tool(s) and clinical judgement create a POC that gives a: Clear prediction of patient's progress: LOW COMPLEXITY            TREATMENT:   (In addition to Assessment/Re-Assessment sessions the following treatments were rendered)     Pre-treatment Symptoms/Complaints:  none  Pain Initial:      Post Session:  0/10     Therapeutic Exercise: (45 Minutes(group)):  Exercises per grid below to improve mobility and strength. Required minimal verbal cues to promote proper body alignment and promote proper body posture. Progressed repetitions as indicated. Gait Training (15 Minutes):  Gait training to improve and/or restore physical functioning as related to mobility, strength and balance. Ambulated 270 Feet (ft) with Supervision;Stand-by assistance using a Walker, rolling and minimal   related to their stance phase and stride length to promote proper body alignment, promote proper body posture and promote proper body mechanics. Date:  6/18/19 Date:  6/19/18 Date:     ACTIVITY/EXERCISE AM PM AM PM AM PM   GROUP THERAPY  ? ?  ?  x  ?  ? Ankle Pumps  10 15 15     Quad Sets  10 15 15     Gluteal Sets  10 15 15     Hip ABd/ADduction  10 15 15     Straight Leg Raises  10 15 15     Knee Slides  10 15 15     Short Arc Quads    15     Long Arc Quads         Chair Slides    15              B = bilateral; AA = active assistive; A = active; P = passive      Treatment/Session Assessment:     Response to Treatment:  Pt continues to do well with mobility. Education:  ? Home Exercises  ? Fall Precautions  ? no pillow under knee ? D/C Instruction Review  ? Knee Prosthesis Review  ? Walker Management/Safety ? Adaptive Equipment as Needed       Interdisciplinary Collaboration:   o Registered Nurse  o Rehabilitation Attendant    After treatment position/precautions:   o Up in chair  o Bed/Chair-wheels locked  o Call light within reach    Compliance with Program/Exercises: Compliant all of the time. Recommendations/Intent for next treatment session:  Treatment next visit will focus on increasing Ms. Villaseñor's independence with bed mobility, transfers, gait training, strength/ROM exercises, modalities for pain, and patient education.       Total Treatment Duration:  PT Patient Time In/Time Out  Time In: 1300  Time Out: 1400 [facility]

## 2019-06-19 NOTE — PROGRESS NOTES
06/19/19 0746   Oxygen Therapy   O2 Sat (%) 98 %   Pulse via Oximetry 68 beats per minute   O2 Device Room air   O2 Flow Rate (L/min) 0 l/min

## 2019-06-20 ENCOUNTER — HOME CARE VISIT (OUTPATIENT)
Dept: SCHEDULING | Facility: HOME HEALTH | Age: 54
End: 2019-06-20
Payer: COMMERCIAL

## 2019-06-20 PROCEDURE — 400013 HH SOC

## 2019-06-20 PROCEDURE — G0151 HHCP-SERV OF PT,EA 15 MIN: HCPCS

## 2019-06-24 ENCOUNTER — HOME CARE VISIT (OUTPATIENT)
Dept: SCHEDULING | Facility: HOME HEALTH | Age: 54
End: 2019-06-24
Payer: COMMERCIAL

## 2019-06-24 VITALS
DIASTOLIC BLOOD PRESSURE: 82 MMHG | SYSTOLIC BLOOD PRESSURE: 142 MMHG | TEMPERATURE: 97.9 F | RESPIRATION RATE: 18 BRPM | HEART RATE: 72 BPM

## 2019-06-24 PROCEDURE — G0157 HHC PT ASSISTANT EA 15: HCPCS

## 2019-06-26 ENCOUNTER — HOME CARE VISIT (OUTPATIENT)
Dept: SCHEDULING | Facility: HOME HEALTH | Age: 54
End: 2019-06-26
Payer: COMMERCIAL

## 2019-06-26 VITALS
TEMPERATURE: 98.1 F | HEART RATE: 76 BPM | DIASTOLIC BLOOD PRESSURE: 84 MMHG | RESPIRATION RATE: 17 BRPM | SYSTOLIC BLOOD PRESSURE: 144 MMHG

## 2019-06-26 PROCEDURE — G0157 HHC PT ASSISTANT EA 15: HCPCS

## 2019-06-28 ENCOUNTER — HOME CARE VISIT (OUTPATIENT)
Dept: SCHEDULING | Facility: HOME HEALTH | Age: 54
End: 2019-06-28
Payer: COMMERCIAL

## 2019-06-28 VITALS
SYSTOLIC BLOOD PRESSURE: 130 MMHG | DIASTOLIC BLOOD PRESSURE: 78 MMHG | RESPIRATION RATE: 18 BRPM | HEART RATE: 76 BPM | TEMPERATURE: 98.1 F

## 2019-06-28 PROCEDURE — G0157 HHC PT ASSISTANT EA 15: HCPCS

## 2019-07-01 ENCOUNTER — HOME CARE VISIT (OUTPATIENT)
Dept: SCHEDULING | Facility: HOME HEALTH | Age: 54
End: 2019-07-01
Payer: COMMERCIAL

## 2019-07-01 VITALS
HEART RATE: 66 BPM | TEMPERATURE: 98 F | RESPIRATION RATE: 16 BRPM | SYSTOLIC BLOOD PRESSURE: 152 MMHG | DIASTOLIC BLOOD PRESSURE: 78 MMHG

## 2019-07-01 PROCEDURE — G0151 HHCP-SERV OF PT,EA 15 MIN: HCPCS

## 2019-07-02 ENCOUNTER — HOSPITAL ENCOUNTER (OUTPATIENT)
Dept: PHYSICAL THERAPY | Age: 54
Discharge: HOME | End: 2019-07-02
Payer: COMMERCIAL

## 2019-07-02 PROCEDURE — 97161 PT EVAL LOW COMPLEX 20 MIN: CPT

## 2019-07-02 NOTE — THERAPY EVALUATION
Kitty Gallegos  : 1965  Primary: Mansoor Cue Essentials*  Secondary:  [facility]rapy Center at [facility], [address] [facility]  [phone]   [phone]       OUTPATIENT PHYSICAL THERAPY:Initial Assessment 2019   ICD-10: Treatment Diagnosis: Pain in joint, lower leg, left M25.561  Precautions/Allergies:   Bee sting [sting, bee] and Pcn [penicillins]   TREATMENT PLAN:  Effective Dates: 2019 TO 2019 (90 days). Frequency/Duration: 2 times a week for 90 Day(s) MEDICAL/REFERRING DIAGNOSIS:  Status post total knee replacement, left [Z96.652]   DATE OF ONSET: Surgery 2019  REFERRING PHYSICIAN: Arcadio Navarro MD MD Orders: Evaluate and treat  Return MD Appointment:      INITIAL ASSESSMENT:  Ms. Salud Foley presents     PROBLEM LIST (Impacting functional limitations):  1. Decreased Strength  2. Decreased Ambulation Ability/Technique  3. Decreased Balance  4. Increased Pain  5. Decreased Flexibility/Joint Mobility INTERVENTIONS PLANNED: (Treatment may consist of any combination of the following)  1. Home Exercise Program (HEP)  2. Manual Therapy  3. Neuromuscular Re-education/Strengthening  4. Therapeutic Exercise/Strengthening     GOALS: (Goals have been discussed and agreed upon with patient.)  Discharge Goals: Time Frame: 90 days  1. Pt to demonstrate decreased disability as per LEFS with a score +5 points. 2. Pt to demonstrate left knee AROM 0-105 , symmetrical with right knee. 3. Pt to ride a bicycle for up to 10 minutes without pain/difficulty. OUTCOME MEASURE:   Tool Used: Lower Extremity Functional Scale (LEFS)  Score:  Initial:80 Most Recent:  (Date: -- )   Interpretation of Score: 20 questions each scored on a 5 point scale with 0 representing \"extreme difficulty or unable to perform\" and 4 representing \"no difficulty\". The lower the score, the greater the functional disability.  80/80 represents no disability. Minimal detectable change is 9 points. MEDICAL NECESSITY:   · Pt s/p left knee replacement, has limitations in ROM and strength limiting ambulation. REASON FOR SERVICES/OTHER COMMENTS:  · Patient presents with decreased left knee AROM /PROM and strength will benefit from therapy at this time to achieve goals established above. Total Duration:  PT Patient Time In/Time Out  Time In: 1000  Time Out: 1100    Rehabilitation Potential For Stated Goals: Good  Regarding Holger Villaseñor's therapy, I certify that the treatment plan above will be carried out by a therapist or under their direction. Thank you for this referral,  Rex Welch PT     Referring Physician Signature: Mary Allan MD No Signature is Required for this note. PAIN/SUBJECTIVE:   Initial: Pain Intensity 1: 0  Post Session:  0/10   HISTORY:   History of Injury/Illness (Reason for Referral): Pt is a 49year old female s/p left total knee arthoplasty on 6/28/2019. Pt stated that she has had home health 3x week last week and has been doing exercises on her own twice a day since the surgery. She would like to have better range of motion and return to her strength prior to surgery. She stated that her pain is constant, dull, intermittent, and throbbing. She states that the anterior aspect of her left knee hurts as well as both hamstrings and quads as well as the calf feel tight. Pt states pain at worst is 8/10 which is aggravated with sitting for long periods of time and alleviated with icing,walking around, and medication. Past Medical History/Comorbidities:   Ms. Shivani Ley  has a past medical history of Arthritis, HDL deficiency, Hypertension, and Rheumatic fever. Ms. Shivani Ley  has a past surgical history that includes hx knee arthroscopy (Right, 2011); hx breast reduction (1994); hx tonsillectomy; hx appendectomy (2009); hx abdominoplasty (1996); and hx hysteroscopy (12/21/2017).   Social History/Living Environment: Pt lives with her  in a one story home    Prior Level of Function/Work/Activity: Pt is a member at the sports[employer]. She is interested in starting water aerobics and having therapy in the pool. She is an        Ambulatory/Rehab [facility] Factors:       No Risk Factors Identified Ability to Rise from Chair:       (0)  Ability to rise in a single movement   Falls Prevention Plan:       No modifications necessary   Total: (5 or greater = High Risk): 0   ©2010 [employer] [address] [place] [address] Patent #[id number]. Federal Law prohibits the replication, distribution or use without written permission from [employer] of [address] [place] [address]   Current Medications:       Current Outpatient Medications:     diclofenac EC (VOLTAREN) 75 mg EC tablet, Take 75 mg by mouth two (2) times a day., Disp: , Rfl:     CALCIUM CARB-VIT D2-MINERALS PO, Take 1 Tab by mouth two (2) times a day., Disp: , Rfl:     hydroCHLOROthiazide (HYDRODIURIL) 25 mg tablet, Take 25 mg by mouth daily. , Disp: , Rfl:     diphenhydrAMINE-acetaminophen (TYLENOL PM EXTRA STRENGTH)  mg tab, Take 2 Tabs by mouth every six (6) hours as needed for Pain., Disp: , Rfl:     aspirin delayed-release 81 mg tablet, Take 1 Tab by mouth every twelve (12) hours every twelve (12) hours for 30 days. , Disp: 60 Tab, Rfl: 0    traMADol (ULTRAM) 50 mg tablet, Take 50 mg by mouth every six (6) hours as needed for Pain. Indications: Pain, Disp: , Rfl:     vit A/vit C/vit E/zinc/copper (ICAPS AREDS PO), Take 1 Cap by mouth two (2) times a day., Disp: , Rfl:     LOSARTAN PO, Take 100 mg by mouth daily. , Disp: , Rfl:     atorvastatin (LIPITOR) 10 mg tablet, Take 10 mg by mouth nightly., Disp: , Rfl:     diclofenac (VOLTAREN) 1 % gel, Apply 2 g to affected area four (4) times daily. , Disp: , Rfl:     calcium carb-vit d2-minerals (CALTRATE PLUS) Tab, Take 1 Tab by mouth two (2) times a day., Disp: , Rfl:    acyclovir (ZOVIRAX) 400 mg tablet, Take 400 mg by mouth daily. Takes in am , Disp: , Rfl:     phentermine (ADIPEX-P) 37.5 mg tablet, Take 37.5 mg by mouth every morning., Disp: , Rfl:    Date Last Reviewed:  7/2/2019   Number of Personal Factors/Comorbidities that affect the Plan of Care: 0: LOW COMPLEXITY   EXAMINATION:   Observation/Orthostatic Postural Assessment:  Incision site intact and healing         ROM:     AROM(PROM) Right Left   Knee flexion 105 75   Knee extension 0 3     Strength:     Manual Muscle Test (*/5) Right Left   Knee extension 4 3   Knee flexion 4 3   Hip flexion 4 3   Hip ER 4 3   Hip IR 4 3   Hip extension 4 4   Hip abduction 4 4   Hip adduction 4 4   Ankle DF 4 4   Ankle PF 4 4        Body Structures Involved:  1. Muscles Body Functions Affected:  1. Sensory/Pain  2. Neuromusculoskeletal  3. Movement Related Activities and Participation Affected:  1.  Mobility   Number of elements (examined above) that affect the Plan of Care: 4+: HIGH COMPLEXITY   CLINICAL PRESENTATION:   Presentation: Stable and uncomplicated: LOW COMPLEXITY   CLINICAL DECISION MAKING:   Use of outcome tool(s) and clinical judgement create a POC that gives a: Clear prediction of patient's progress: LOW COMPLEXITY

## 2019-07-03 ENCOUNTER — HOSPITAL ENCOUNTER (OUTPATIENT)
Dept: PHYSICAL THERAPY | Age: 54
Discharge: HOME | End: 2019-07-03
Payer: COMMERCIAL

## 2019-07-03 ENCOUNTER — APPOINTMENT (OUTPATIENT)
Dept: PHYSICAL THERAPY | Age: 54
End: 2019-07-03
Payer: COMMERCIAL

## 2019-07-03 PROCEDURE — 97110 THERAPEUTIC EXERCISES: CPT

## 2019-07-03 PROCEDURE — 97140 MANUAL THERAPY 1/> REGIONS: CPT

## 2019-07-03 NOTE — PROGRESS NOTES
Dionicio Holter  : 1965  Primary: Erlinda Alberto Essentials*  Secondary:  [facility]rapy Center at [facility] [address], [address] [facility]  [phone]   [phone]      OUTPATIENT PHYSICAL THERAPY: Daily Treatment Note 7/3/2019  Visit Count:  1    ICD-10: Treatment Diagnosis: Pain in joint, lower leg, left M25.561  Precautions/Allergies:   Bee sting [sting, bee] and Pcn [penicillins]   TREATMENT PLAN:  Effective Dates: 2019 TO 2019 (90 days). Frequency/Duration: 2 times a week for 90 Day(s) MEDICAL/REFERRING DIAGNOSIS:  Status post total knee replacement, left [Z96.652]   DATE OF ONSET: Surgery 2019  REFERRING PHYSICIAN: Truong Desir MD MD Orders: Evaluate and treat  Return MD Appointment:      Pre-treatment Symptoms/Complaints:    Pain: Initial: Pain Intensity 1: 0 0/10 Post Session:  0/10   Medications Last Reviewed:  7/3/2019  Updated Objective Findings:  Left knee AROM 5-90 degrees  TREATMENT:     Therapeutic Exercise: ( 45 minutes):  Exercises per grid below to improve mobility and strength. Required minimal verbal cues to promote proper body alignment. Progressed complexity of movement as indicated. Date:  7/3/2019   Activity/Exercise Parameters   Bike ; Level 3 15 minutes   LAQ 3x10   SAQ 3x10   Heelslides 3x10   SLR flexion 3x10   Prone knee curls 5x30''   Quadruped stretch 5x30''   Calf stretch 3x30''   Hamstring stretch 3x30''       Manual Therapy (   15 minutes  ): Manual techniques to facilitate improved motion and decreased pain.  (Used abbreviations: MET - muscle energy technique; PNF - proprioceptive neuromuscular facilitation; NMR - neuromuscular re-education; a/p - anterior to posterior; p/a - posterior to anterior)   Manual stretching to calf and hamstring bilaterally  STM to left knee and calf   PROM into knee flexion, extension , Left knee      Treatment/Session Summary:    · Response to Treatment:  Pt tolerated initiation of exercises today to encourage ROM and strength. .  · Communication/Consultation:  None today  · Equipment provided today:  HEP provided today  · Recommendations/Intent for next treatment session: Next visit will focus on improving ROM and strength of left knee.     Total Treatment Billable Duration:  60 minutes  PT Patient Time In/Time Out  Time In: 1000  Time Out: R Sharmin Modi [address], PT    Future Appointments   Date Time Provider Usha Ann   7/9/2019 10:00 AM Karri Steward, PT [facility]   7/16/2019 10:00 AM Karri Steward, PT [facility]   7/19/2019 10:00 AM Karri Steward, PT [facility]   7/22/2019 10:00 AM Karri Steward, PT [facility]   7/26/2019 10:00 AM Karri Steward, PT [facility]   7/30/2019 10:00 AM Karri Steward, PT [facility]   8/2/2019 10:00 AM Karri Steward, PT [facility]

## 2019-07-09 ENCOUNTER — HOSPITAL ENCOUNTER (OUTPATIENT)
Dept: PHYSICAL THERAPY | Age: 54
Discharge: HOME | End: 2019-07-09
Payer: COMMERCIAL

## 2019-07-09 PROCEDURE — 97110 THERAPEUTIC EXERCISES: CPT

## 2019-07-09 PROCEDURE — 97140 MANUAL THERAPY 1/> REGIONS: CPT

## 2019-07-09 NOTE — PROGRESS NOTES
Joanna Foil  : 1965  Primary: Kirit Mcphersonten Essentials*  Secondary:  [facility]rapy Center at [facility] [address], [address] [facility]  [phone]   [phone]    OUTPATIENT PHYSICAL THERAPY: Daily Treatment Note 2019  Visit Count:  2    ICD-10: Treatment Diagnosis: Pain in joint, lower leg, left M25.561  Precautions/Allergies:   Bee sting [sting, bee] and Pcn [penicillins]   TREATMENT PLAN:  Effective Dates: 2019 TO 2019 (90 days). Frequency/Duration: 2 times a week for 90 Day(s) MEDICAL/REFERRING DIAGNOSIS:  Status post total knee replacement, left [Z96.652]   DATE OF ONSET: Surgery 2019  REFERRING PHYSICIAN: Simona Vizcaino MD MD Orders: Evaluate and treat  Return MD Appointment:      Pre-treatment Symptoms/Complaints:  \"I'm doing all of my exercises and doing well\" Pt to come to therapy tomorrow and also get into the pool. Pain: Initial: Pain Intensity 1: 3  Pain Location 1: Knee  Pain Orientation 1: Left  Post Session:  0/10   Medications Last Reviewed:  2019  Updated Objective Findings:  Left knee AROM 3-90 degrees  TREATMENT:     Therapeutic Exercise: ( 45 minutes):  Exercises per grid below to improve mobility and strength. Required minimal verbal cues to promote proper body alignment. Progressed complexity of movement as indicated. Date:  2019   Activity/Exercise Parameters   Bike ; Level 3 10 minutes   LAQ 3x10   SAQ 3x10   Heelslides;supine and seated 3x10   SLR flexion 3x10   Prone knee curls 5x30''   Quadruped stretch 5x30''   Calf stretch 3x30''   Hamstring stretch 3x30''   Squats 3x10       Manual Therapy (10 minutes): Manual techniques to facilitate improved motion and decreased pain.  (Used abbreviations: MET - muscle energy technique; PNF - proprioceptive neuromuscular facilitation; NMR - neuromuscular re-education; a/p - anterior to posterior; p/a - posterior to anterior)   Manual stretching to calf and hamstring bilaterally  STM to left knee and calf   PROM into left knee flexion, extension   Pt educated on patella mobilizations; pt demonstrated understanding      Treatment/Session Summary:    · Response to Treatment:  Pt progressing towards goals and demonstrates motivation towards goals at each visit. Tenderness observed in posterior left knee and tightness in hamstrings. Improved left knee extension. · Communication/Consultation:  None today  · Equipment provided today:  HEP provided today  · Recommendations/Intent for next treatment session: Next visit will focus on improving ROM and strength of left knee.     Total Treatment Billable Duration:  55 minutes  PT Patient Time In/Time Out  Time In: 1000  Time Out: [address], PT    Future Appointments   Date Time Provider Usha Ann   7/10/2019  9:00 AM Pan Iraheta PT [facility]   7/16/2019 10:00 AM Pan Iraheta, PT [facility]   7/19/2019 10:00 AM Pan Iraheta, PT [facility]   7/22/2019 10:00 AM Pan Iraheta, PT [facility]   7/26/2019 10:00 AM Pan Iraheta, PT [facility]   7/30/2019 10:00 AM Pan Iraheta, PT [facility]   8/2/2019 10:00 AM Pan Iraheta, PT [facility]

## 2019-07-10 ENCOUNTER — HOSPITAL ENCOUNTER (OUTPATIENT)
Dept: PHYSICAL THERAPY | Age: 54
Discharge: HOME | End: 2019-07-10
Payer: COMMERCIAL

## 2019-07-10 PROCEDURE — 97110 THERAPEUTIC EXERCISES: CPT

## 2019-07-10 PROCEDURE — 97140 MANUAL THERAPY 1/> REGIONS: CPT

## 2019-07-11 PROCEDURE — A4649 SURGICAL SUPPLIES: HCPCS

## 2019-07-12 ENCOUNTER — HOSPITAL ENCOUNTER (OUTPATIENT)
Dept: PHYSICAL THERAPY | Age: 54
Discharge: HOME | End: 2019-07-12
Payer: COMMERCIAL

## 2019-07-12 PROCEDURE — 97140 MANUAL THERAPY 1/> REGIONS: CPT

## 2019-07-12 PROCEDURE — 97110 THERAPEUTIC EXERCISES: CPT

## 2019-07-12 NOTE — PROGRESS NOTES
Debbie Babinski  : 1965  Primary: Luis Eduardo Machuca Essentials*  Secondary:  Therapy Center at [facility] [address], [address] [facility]  [phone]   [phone]    OUTPATIENT PHYSICAL THERAPY: Daily Treatment Note 2019  Visit Count:  4    ICD-10: Treatment Diagnosis: Pain in joint, lower leg, left M25.561  Precautions/Allergies:   Bee sting [sting, bee] and Pcn [penicillins]   TREATMENT PLAN:  Effective Dates: 2019 TO 2019 (90 days). Frequency/Duration: 2 times a week for 90 Day(s) MEDICAL/REFERRING DIAGNOSIS:  Status post total knee replacement, left [Z96.652]   DATE OF ONSET: Surgery 2019  REFERRING PHYSICIAN: Nicole Roblero MD MD Orders: Evaluate and treat  Return MD Appointment:      Pre-treatment Symptoms/Complaints:  Pt states she is doing well this morning, feeling like she is making progress  Pain: Initial: Pain Intensity 1: 3  Pain Location 1: Knee  Pain Orientation 1: Left  Post Session:  3/10   Medications Last Reviewed:  2019  Updated Objective Findings:  Left knee AROM 0 degrees, 0-105 PROM  TREATMENT:     Therapeutic Exercise: (45 Minutes):  Exercises per grid below to improve mobility and strength. Required minimal verbal cues to promote proper body alignment. Progressed complexity of movement as indicated. Date:  2019   Activity/Exercise Parameters   Bike ; Level 3 10 minutes   LAQ --   Heelslides;supine and seated 3x10   SLR flexion --   Prone knee stretch 4 minutes   Quadruped stretch 5x30''   Calf stretch 3x30''   Hamstring stretch 3x30''   Squats - leg press 3x10       Manual Therapy (10 minutes): Manual techniques to facilitate improved motion and decreased pain.  (Used abbreviations: MET - muscle energy technique; PNF - proprioceptive neuromuscular facilitation; NMR - neuromuscular re-education; a/p - anterior to posterior; p/a - posterior to anterior)   Manual stretching to calf and hamstring bilaterally  STM to left knee and calf   PROM into left knee flexion, extension in supine and prone  Mobilizations to patella inferior and superior to promote flexion and extension      Treatment/Session Summary:    · Response to Treatment:  Pt able to improve passive knee flexion in prone by 2 degrees. Patella mobility in superior/inferior direction remains restricted. Active knee extension is at 0 degrees allowing for good quad activation. · Communication/Consultation:  None today  · Equipment provided today:  None today  · Recommendations/Intent for next treatment session: Next visit will focus on improving ROM and strength of left knee.     Total Treatment Billable Duration:  55 minutes  PT Patient Time In/Time Out  Time In: 0830  Time Out: 0930  Jordan Cates    Future Appointments   Date Time Provider Usha Ann   7/16/2019 10:00 AM Saira Walsh, PT [facility]   7/19/2019 10:00 AM Saira Walsh, PT [facility]   7/22/2019 10:00 AM Saira Walsh, PT [facility]   7/26/2019 10:00 AM Saira Walsh, PT [facility]   7/30/2019 10:00 AM Saira Walsh, PT [facility]   8/2/2019 10:00 AM Saira Walsh, PT [facility]

## 2019-07-16 ENCOUNTER — HOSPITAL ENCOUNTER (OUTPATIENT)
Dept: PHYSICAL THERAPY | Age: 54
Discharge: HOME | End: 2019-07-16
Payer: COMMERCIAL

## 2019-07-16 PROCEDURE — 97140 MANUAL THERAPY 1/> REGIONS: CPT

## 2019-07-16 PROCEDURE — 97110 THERAPEUTIC EXERCISES: CPT

## 2019-07-16 NOTE — PROGRESS NOTES
Obeyannie Farris  : 1965  Primary: Jimmie Lei Essentials*  Secondary:  Therapy Center at [facility] [address], [address] [facility]  [phone]   [phone]    OUTPATIENT PHYSICAL THERAPY: Daily Treatment Note 2019  Visit Count:  5    ICD-10: Treatment Diagnosis: Pain in joint, lower leg, left M25.561  Precautions/Allergies:   Bee sting [sting, bee] and Pcn [penicillins]   TREATMENT PLAN:  Effective Dates: 2019 TO 2019 (90 days). Frequency/Duration: 2 times a week for 90 Day(s) MEDICAL/REFERRING DIAGNOSIS:  Status post total knee replacement, left [Z96.652]   DATE OF ONSET: Surgery 2019  REFERRING PHYSICIAN: Cee Carmona MD MD Orders: Evaluate and treat  Return MD Appointment:      Pre-treatment Symptoms/Complaints:  Pt stated that she is doing well. She has been getting into the pool more and using her bike at home. Pt stated that she is working on her gait. Pain: Initial: Pain Intensity 1: 2  Pain Location 1: Knee  Pain Orientation 1: Left  Post Session:  2/10   Medications Last Reviewed:  2019  Updated Objective Findings:  Left knee AROM 0-95 degrees, 0-110 PROM  TREATMENT:     Therapeutic Exercise: ( 45):  Exercises per grid below to improve mobility and strength. Required minimal verbal cues to promote proper body alignment. Progressed complexity of movement as indicated. Date:  2019   Activity/Exercise Parameters   Bike ; Level 3 10 minutes   Heelslides;supine and seated 3x10   SLR flexion 3x10 2.5 #   Prone knee stretch 4 minutes   Quadruped stretch 5x30''   Calf stretch 3x30''   Hamstring stretch 3x30''   Squats - leg press 3x10       Manual Therapy (10 minutes): Manual techniques to facilitate improved motion and decreased pain.  (Used abbreviations: MET - muscle energy technique; PNF - proprioceptive neuromuscular facilitation; NMR - neuromuscular re-education; a/p - anterior to posterior; p/a - posterior to anterior)   Manual stretching to calf and hamstring bilaterally  STM to left knee and calf   PROM into left knee flexion, extension in supine and prone  Hold/relax into left knee flexion with pt in prone  Mobilizations to patella inferior and superior to promote flexion and extension      Treatment/Session Summary:    · Response to Treatment:  Pt was able to go fully around on the bike today as she was thrilled to do so. Pt with improved ROM at each visit with increased tolerance to manual pressure and PROM. · Communication/Consultation:  None today  · Equipment provided today:  None today  · Recommendations/Intent for next treatment session: Next visit will focus on improving ROM and strength of left knee.     Total Treatment Billable Duration:  55 minutes  PT Patient Time In/Time Out  Time In: 1000  Time Out: [address], PT    Future Appointments   Date Time Provider Usha Ann   7/19/2019  3:00 PM Marques Lopez PT [facility]   7/22/2019 10:00 AM Marques Lopez, PT [facility]   7/26/2019 10:00 AM Marques Lopez PT [facility]   7/30/2019 10:00 AM Marques Lopez, PT [facility]   8/2/2019 10:00 AM Marques Lopez, PT [facility]

## 2019-07-19 ENCOUNTER — HOSPITAL ENCOUNTER (OUTPATIENT)
Dept: PHYSICAL THERAPY | Age: 54
Discharge: HOME | End: 2019-07-19
Payer: COMMERCIAL

## 2019-07-19 ENCOUNTER — APPOINTMENT (OUTPATIENT)
Dept: PHYSICAL THERAPY | Age: 54
End: 2019-07-19
Payer: COMMERCIAL

## 2019-07-19 PROCEDURE — 97110 THERAPEUTIC EXERCISES: CPT

## 2019-07-19 PROCEDURE — 97140 MANUAL THERAPY 1/> REGIONS: CPT

## 2019-07-19 NOTE — PROGRESS NOTES
Raúl Wright  : 1965  Primary: Nadege Silva Essentials*  Secondary:  [facility], [address] [facility]  [phone]   [phone]    OUTPATIENT PHYSICAL THERAPY: Daily Treatment Note 2019  Visit Count:  6    ICD-10: Treatment Diagnosis: Pain in joint, lower leg, left M25.561  Precautions/Allergies:   Bee sting [sting, bee] and Pcn [penicillins]   TREATMENT PLAN:  Effective Dates: 2019 TO 2019 (90 days). Frequency/Duration: 2 times a week for 90 Day(s) MEDICAL/REFERRING DIAGNOSIS:  Status post total knee replacement, left [Z96.652]   DATE OF ONSET: Surgery 2019  REFERRING PHYSICIAN: Ryne Aguilar MD MD Orders: Evaluate and treat  Return MD Appointment:      Pre-treatment Symptoms/Complaints:  Pt stated that she has been doing her exercises at home and feel that she is walking a lot better. Pain: Initial: Pain Intensity 1: 1  Pain Location 1: Knee  Pain Orientation 1: Left  Post Session:  2/10   Medications Last Reviewed:  2019  Updated Objective Findings:  Left knee AROM 0-100 degrees, 0-115 PROM  TREATMENT:     Therapeutic Exercise: ( 45):  Exercises per grid below to improve mobility and strength. Required minimal verbal cues to promote proper body alignment. Progressed complexity of movement as indicated. Date:  2019   Activity/Exercise Parameters   Bike ; Level 3 10 minutes   Heelslides;supine and seated 3x10   SLR flexion, extension, knee curls 3x10 3 #   Prone knee stretch 4 minutes   Quadruped stretch 5x30''   Calf stretch 3x30''   Hamstring stretch 3x30''   Squats - leg press 3x10       Manual Therapy (10 minutes): Manual techniques to facilitate improved motion and decreased pain.  (Used abbreviations: MET - muscle energy technique; PNF - proprioceptive neuromuscular facilitation; NMR - neuromuscular re-education; a/p - anterior to posterior; p/a - posterior to anterior)   Manual stretching to calf and hamstring bilaterally  STM to left knee and calf   PROM into left knee flexion, extension in supine and prone  Hold/relax into left knee flexion with pt in prone  Mobilizations to patella inferior and superior to promote flexion and extension    Treatment/Session Summary:    · Response to Treatment:   Pt with improved ROM at each visit. · Communication/Consultation:  None today  · Equipment provided today:  None today  · Recommendations/Intent for next treatment session: Next visit will focus on improving ROM and strength of left knee.     Total Treatment Billable Duration:  55 minutes  PT Patient Time In/Time Out  Time In: 1500  Time Out: 1600  Lizet Cavazos PT    Future Appointments   Date Time Provider Usha Ann   7/22/2019 10:00 AM Saundra Starch, PT [facility]   7/23/2019  9:00 AM Saundra Starch, PT [facility]   7/26/2019 10:00 AM Saundra Starch, PT [facility]   7/30/2019 10:00 AM Saundra Starch, PT [facility]   8/2/2019 10:00 AM Saundra Starch, PT [facility]

## 2019-07-22 ENCOUNTER — HOSPITAL ENCOUNTER (OUTPATIENT)
Dept: PHYSICAL THERAPY | Age: 54
Discharge: HOME | End: 2019-07-22
Payer: COMMERCIAL

## 2019-07-22 PROCEDURE — 97140 MANUAL THERAPY 1/> REGIONS: CPT

## 2019-07-22 PROCEDURE — 97110 THERAPEUTIC EXERCISES: CPT

## 2019-07-23 ENCOUNTER — HOSPITAL ENCOUNTER (OUTPATIENT)
Dept: PHYSICAL THERAPY | Age: 54
Discharge: HOME | End: 2019-07-23
Payer: COMMERCIAL

## 2019-07-23 PROCEDURE — 97110 THERAPEUTIC EXERCISES: CPT

## 2019-07-23 PROCEDURE — 97140 MANUAL THERAPY 1/> REGIONS: CPT

## 2019-07-23 NOTE — PROGRESS NOTES
Teodora Ramirez  : 1965  Primary: Yoshi Reddys Essentials*  Secondary:  Therapy Center at [facility], [address] [facility]  [phone]   [phone]    OUTPATIENT PHYSICAL THERAPY: Daily Treatment Note 2019  Visit Count:  8    ICD-10: Treatment Diagnosis: Pain in joint, lower leg, left M25.561  Precautions/Allergies:   Bee sting [sting, bee] and Pcn [penicillins]   TREATMENT PLAN:  Effective Dates: 2019 TO 2019 (90 days). Frequency/Duration: 2 times a week for 90 Day(s) MEDICAL/REFERRING DIAGNOSIS:  Status post total knee replacement, left [Z96.652]   DATE OF ONSET: Surgery 2019  REFERRING PHYSICIAN: Arnold Arenas MD MD Orders: Evaluate and treat  Return MD Appointment:      Pre-treatment Symptoms/Complaints:  Pt stated that she was on her feet quite a bit in the last 24 hours therefore feels tired however no pain at the moment. Pain: Initial: Pain Intensity 1: 0  Pain Location 1: Knee  Pain Orientation 1: Left  Post Session:  0/10   Medications Last Reviewed:  2019  Updated Objective Findings:  Left knee AROM 0-110 degrees, 0-115 PROM  TREATMENT:     Therapeutic Exercise: ( 45):  Exercises per grid below to improve mobility and strength. Required minimal verbal cues to promote proper body alignment. Progressed complexity of movement as indicated. Date:  2019   Activity/Exercise Parameters   Bike ; Level 3 10 minutes   Heelslides;supine and seated 3x10   SLR flexion, abduction,extension, knee curls 2.5# 3x10 LLE  2.5# 3x10 RLE   Prone knee stretch 5 minutes   Quadruped stretch 5x30''   Calf stretch 3x30''   Hamstring stretch 3x30''   Squats, heel raises - leg press 3x10   Squats 3x10       Manual Therapy (10 minutes): Manual techniques to facilitate improved motion and decreased pain.  (Used abbreviations: MET - muscle energy technique; PNF - proprioceptive neuromuscular facilitation; NMR - neuromuscular re-education; a/p - anterior to posterior; p/a - posterior to anterior)   Manual stretching to calf and hamstring bilaterally  STM to left knee and calf   PROM into left knee flexion, extension in supine and prone  Hold/relax into left knee flexion with pt in prone  Mobilizations to patella inferior and superior to promote flexion and extension    Treatment/Session Summary:    · Response to Treatment:   Pt with improved tolerance to PROM at each visit. Pt to be seen for a third visit on Friday before she leaves on vacation. · Communication/Consultation:  None today  · Equipment provided today:  None today  · Recommendations/Intent for next treatment session: Next visit will focus on improving ROM and strength of left knee.     Total Treatment Billable Duration:  55 minutes  PT Patient Time In/Time Out  Time In: 0845  Time Out: 8014  Raúl Weinberg PT    Future Appointments   Date Time Provider Usha Ann   7/26/2019 10:00 AM TANA Moore   7/30/2019 10:00 AM TANA Moore   8/2/2019 10:00 AM TANA oMore

## 2019-07-26 ENCOUNTER — HOSPITAL ENCOUNTER (OUTPATIENT)
Dept: PHYSICAL THERAPY | Age: 54
Discharge: HOME | End: 2019-07-26
Payer: COMMERCIAL

## 2019-07-26 PROCEDURE — 97110 THERAPEUTIC EXERCISES: CPT

## 2019-07-26 PROCEDURE — 97140 MANUAL THERAPY 1/> REGIONS: CPT

## 2019-07-26 NOTE — PROGRESS NOTES
Nickie Ramirez  : 1965  Primary: Denizmicah Abbott Essentials*  Secondary:  Therapy Center at [facility] 10, [address] [facility]  [phone]   [phone]    OUTPATIENT PHYSICAL THERAPY: Daily Treatment Note 2019  Visit Count:  9    ICD-10: Treatment Diagnosis: Pain in joint, lower leg, left M25.561  Precautions/Allergies:   Bee sting [sting, bee] and Pcn [penicillins]   TREATMENT PLAN:  Effective Dates: 2019 TO 2019 (90 days). Frequency/Duration: 2 times a week for 90 Day(s) MEDICAL/REFERRING DIAGNOSIS:  Status post total knee replacement, left [Z96.652]   DATE OF ONSET: Surgery 2019  REFERRING PHYSICIAN: Gloria Jackson MD MD Orders: Evaluate and treat  Return MD Appointment:      Pre-treatment Symptoms/Complaints:  Pt is going away this afternoon, driving with her  to [place]. Pt with no new complaints. She saw the surgeon a few days ago who stated that she was on the right track. Pain: Initial: Pain Intensity 1: 1  Pain Location 1: Knee  Pain Orientation 1: Left  Post Session:  0/10   Medications Last Reviewed:  2019  Updated Objective Findings:  Left knee AROM 0-100 degrees, 0-115 PROM  TREATMENT:     Therapeutic Exercise: ( 45):  Exercises per grid below to improve mobility and strength. Required minimal verbal cues to promote proper body alignment. Progressed complexity of movement as indicated. Date:  2019   Activity/Exercise Parameters   Bike ; Level 3 10 minutes   Heelslides;supine and seated 3x10   SLR flexion, abduction,extension, knee curls 2.5# 3x10 LLE  2.5# 3x10 RLE   Prone knee stretch 5 minutes   Quadruped stretch 5x30''   Calf stretch 3x30''   Hamstring stretch 3x30''   Squats, heel raises - leg press 3x10   Squats 3x10       Manual Therapy (10 minutes): Manual techniques to facilitate improved motion and decreased pain.  (Used abbreviations: MET - muscle energy technique; PNF - proprioceptive neuromuscular facilitation; NMR - neuromuscular re-education; a/p - anterior to posterior; p/a - posterior to anterior)   Manual stretching to calf and hamstring bilaterally  STM to left knee and calf   PROM into left knee flexion, extension in supine and prone  Hold/relax into left knee flexion with pt in prone  Mobilizations to patella inferior and superior to promote flexion and extension  Rolling out of calf and quadricep with tool to loosen tightened muscles    Treatment/Session Summary:    · Response to Treatment: Pt tolerated session today, decreased AROM noted however pt was on her feet all week due to being busy at work. Pt aware of her HEP. Communication/Consultation:  None today  · Equipment provided today:  None today  · Recommendations/Intent for next treatment session: Pt will only be seen once next week due to conflicting work schedule. Progress note due next visit.      Total Treatment Billable Duration:  55 minutes  PT Patient Time In/Time Out  Time In: 1000  Time Out: 7 [facility] Katlyn Canseco PT    Future Appointments   Date Time Provider Usha Ann   7/30/2019 10:00 AM Robert Watson   8/2/2019 10:00 AM Tanner Rodriguez PT [facility]

## 2019-07-26 NOTE — PROGRESS NOTES
Dionicio Holter  : 1965  Primary: Erlinda Alberto Essentials*  Secondary:  Therapy Center at [address]  [phone]   [phone]       OUTPATIENT PHYSICAL THERAPY:Progress Report 2019   ICD-10: Treatment Diagnosis: Pain in joint, lower leg, left M25.561  Precautions/Allergies:   Bee sting [sting, bee] and Pcn [penicillins]   TREATMENT PLAN:  Effective Dates: 2019 TO 2019 (90 days). Frequency/Duration: 2 times a week for 90 Day(s) MEDICAL/REFERRING DIAGNOSIS:  Status post total knee replacement, left [Z96.652]   DATE OF ONSET: Surgery 2019  REFERRING PHYSICIAN: Truong Desir MD MD Orders: Evaluate and treat  Return MD Appointment:      As of 2019, Dionicio Holter has attended 10 out of 10 scheduled visits, with 0 cancellation(s) and 0 no shows. Ms. Ralph Lopez presents with continued decreased strength and ROM in the left knee, she is progressing well and towards goals established below. PROBLEM LIST (Impacting functional limitations):  1. Decreased Strength  2. Decreased Ambulation Ability/Technique  3. Decreased Balance  4. Increased Pain  5. Decreased Flexibility/Joint Mobility INTERVENTIONS PLANNED: (Treatment may consist of any combination of the following)  1. Home Exercise Program (HEP)  2. Manual Therapy  3. Neuromuscular Re-education/Strengthening  4. Therapeutic Exercise/Strengthening     GOALS: (Goals have been discussed and agreed upon with patient.)  Discharge Goals: Time Frame: 90 days  6. Pt to demonstrate decreased disability as per LEFS with a score +5 points. ONGOING 19  7. Pt to demonstrate left knee AROM 0-105 , symmetrical with right knee. GOAL MET 19  8. Pt to ride a bicycle for up to 10 minutes without pain/difficulty.   GOAL MET 19    OUTCOME MEASURE:   Tool Used: Lower Extremity Functional Scale (LEFS)  Score:  Initial: Most Recent:  (Date: 7/26/19 )   Interpretation of Score: 20 questions each scored on a 5 point scale with 0 representing \"extreme difficulty or unable to perform\" and 4 representing \"no difficulty\". The lower the score, the greater the functional disability. 80/80 represents no disability. Minimal detectable change is 9 points. UPDATED OBJECTIVE FINDINGS:    Observation/Orthostatic Postural Assessment:  Incision healing         ROM:     AROM(PROM) Right Left   Knee flexion 105 110/115   Knee extension 0 0     Strength:     Manual Muscle Test (*/5) Right Left   Knee extension 4 3   Knee flexion 4 3   Hip flexion 4 3   Hip ER 4 3   Hip IR 4 3   Hip extension 4 4   Hip abduction 4 4   Hip adduction 4 4   Ankle DF 4 4   Ankle PF 4 4      Ambulatory/Rehab Services H2 Model Falls Risk Assessment    Risk Factors:       No Risk Factors Identified Ability to Rise from Chair:       (0)  Ability to rise in a single movement    Falls Prevention Plan:       No modifications necessary   Total: (5 or greater = High Risk): 0    ©2010 [facility] [employer]. All Rights Reserved. [facility] [employer] Patent #[id number]. Federal Law prohibits the replication, distribution or use without written permission from [facility]:   · Pt s/p left knee replacement, has limitations in ROM and strength limiting ambulation that continues to limit her naranjo activities. REASON FOR SERVICES/OTHER COMMENTS:  · Pt is ambulating with symmetrical gait and is improving each day however pain persists and inability to return to all activities continues. Total Duration:  55 minutes  PT Patient Time In/Time Out  Time In: 1000  Time Out: 1100    Rehabilitation Potential For Stated Goals: Good  Regarding Ashkan Ross Charleen's therapy, I certify that the treatment plan above will be carried out by a therapist or under their direction.   Thank you for this referral,  Julio Che PT     Referring Physician Signature: Tarun Hidalgo, Tio Justice MD No Signature is Required for this note.      EXAMINATION:      1.  1.  1.                   Clear prediction of patient's progress: LOW COMPLEXITY

## 2019-07-30 ENCOUNTER — HOSPITAL ENCOUNTER (OUTPATIENT)
Dept: PHYSICAL THERAPY | Age: 54
Discharge: HOME | End: 2019-07-30
Payer: COMMERCIAL

## 2019-07-30 PROCEDURE — 97110 THERAPEUTIC EXERCISES: CPT

## 2019-07-30 PROCEDURE — 97140 MANUAL THERAPY 1/> REGIONS: CPT

## 2019-07-30 NOTE — PROGRESS NOTES
Yani Davies  : 1965  Primary: Kaity Estes Essentials*  Secondary:  Therapy Center at [facility], [address] [facility]  [phone]   [phone]    OUTPATIENT PHYSICAL THERAPY: Daily Treatment Note 2019  Visit Count:  10    ICD-10: Treatment Diagnosis: Pain in joint, lower leg, left M25.561  Precautions/Allergies:   Bee sting [sting, bee] and Pcn [penicillins]   TREATMENT PLAN:  Effective Dates: 2019 TO 2019 (90 days). Frequency/Duration: 2 times a week for 90 Day(s) MEDICAL/REFERRING DIAGNOSIS:  Status post total knee replacement, left [Z96.652]   DATE OF ONSET: Surgery 2019  REFERRING PHYSICIAN: Doris Washington MD MD Orders: Evaluate and treat  Return MD Appointment:      Pre-treatment Symptoms/Complaints:  Pt returns after being away on a road trip to [place]. She stated that she felt a bit stiff after the long car ride however no significance change in pain. Pain: Initial: Pain Intensity 1: 1  Pain Location 1: Knee  Pain Orientation 1: Left  Post Session:  0/10   Medications Last Reviewed:  2019  Updated Objective Findings:  Left knee AROM 0-115 degrees, 0-120 PROM  TREATMENT:     Therapeutic Exercise: ( 45):  Exercises per grid below to improve mobility and strength. Required minimal verbal cues to promote proper body alignment. Progressed complexity of movement as indicated. Date:  2019   Activity/Exercise Parameters   Bike ; Level 3 10 minutes   Heelslides;supine and seated 3x10   SLR flexion, abduction,extension, knee curls 2.5# 3x10 LLE  2.5# 3x10 RLE   Prone knee stretch 5 minutes   Quadruped stretch 5x30''   Calf stretch 3x30''   Hamstring stretch 3x30''   Squats, heel raises - leg press 3x10   Squats 3x10   Lunges 25' x4       Manual Therapy (10 minutes): Manual techniques to facilitate improved motion and decreased pain.  (Used abbreviations: MET - muscle energy technique; PNF - proprioceptive neuromuscular facilitation; NMR - neuromuscular re-education; a/p - anterior to posterior; p/a - posterior to anterior)   Manual stretching to calf and hamstring bilaterally  STM to left knee and calf   PROM into left knee flexion, extension in supine and prone  Hold/relax into left knee flexion with pt in prone  Mobilizations to patella inferior and superior to promote flexion and extension    Treatment/Session Summary:    · Response to Treatment: Pt with improved range of motion at each visit. · Communication/Consultation:  None today  · Equipment provided today:  None today  · Recommendations/Intent for next treatment session: Progress strength and ROM activities as needed.      Total Treatment Billable Duration:  55 minutes  PT Patient Time In/Time Out  Time In: 1000  Time Out: [address], PT    Future Appointments   Date Time Provider Usha Ann   7/31/2019  9:00 AM Jami Juan, PT [facility]   8/6/2019  9:00 AM Jami Juan PT [facility]   8/7/2019  9:00 AM Jami Juan PT [facility]   8/14/2019 10:00 AM Jami Juan PT [facility]   8/21/2019  9:00 AM Jami Juan PT [facility]   8/28/2019  9:00 AM Jami Juan, PT [facility]

## 2019-07-31 ENCOUNTER — HOSPITAL ENCOUNTER (OUTPATIENT)
Dept: PHYSICAL THERAPY | Age: 54
Discharge: HOME | End: 2019-07-31
Payer: COMMERCIAL

## 2019-07-31 PROCEDURE — 97110 THERAPEUTIC EXERCISES: CPT

## 2019-07-31 NOTE — PROGRESS NOTES
Sddhiraj Ray  : 1965  Primary: Melchor Muse Essentials*  Secondary:  Therapy Center at [place] [address], [address]  [phone]   [phone]    OUTPATIENT PHYSICAL THERAPY: Daily Treatment Note 2019  Visit Count:  11    ICD-10: Treatment Diagnosis: Pain in joint, lower leg, left M25.561  Precautions/Allergies:   Bee sting [sting, bee] and Pcn [penicillins]   TREATMENT PLAN:  Effective Dates: 2019 TO 2019 (90 days). Frequency/Duration: 2 times a week for 90 Day(s) MEDICAL/REFERRING DIAGNOSIS:  Status post total knee replacement, left [Z96.652]   DATE OF ONSET: Surgery 2019  REFERRING PHYSICIAN: Yvon Boo MD MD Orders: Evaluate and treat  Return MD Appointment:      Pre-treatment Symptoms/Complaints:  Pt with no new complaints. She did not sleep well last night due to her  moving a lot in the bed. Pt has not been in the pool since last week associating it with the stiffness she has been feeling. Pain: Initial: Pain Intensity 1: 1  Pain Location 1: Knee  Pain Orientation 1: Left  Post Session:  0/10   Medications Last Reviewed:  2019  Updated Objective Findings:  Left knee AROM 0-110 degrees, 0-115 PROM  TREATMENT:     Therapeutic Exercise: ( 55):  Exercises per grid below to improve mobility and strength. Required minimal verbal cues to promote proper body alignment. Progressed complexity of movement as indicated.      Date:  2019   Activity/Exercise Parameters   Bike ; Level 4 10 minutes   Heelslides;supine and seated 3x10   Standing SLR flexion, abduction,extension, knee curls 2.5# 3x10 LLE     Prone knee stretch 5 minutes   Knee flexion stretch on step 3 minutes   Calf stretch 3x30''   Hamstring stretch 3x30''   Squats, heel raises - leg press 3x10   Squats 3x10   Lunges 25' x4   Step taps 8 inch 3x10       Treatment/Session Summary:    · Response to Treatment: Pt with some tightness in her left knee limiting AROM, pt planning on getting into the pool in the next few days. · Communication/Consultation:  None today  · Equipment provided today:  None today  · Recommendations/Intent for next treatment session: Therapy session in the sports club next session.  .     Total Treatment Billable Duration:  55 minutes  PT Patient Time In/Time Out  Time In: 0900  Time Out: 1000  Jaquelin Mazariegos PT    Future Appointments   Date Time Provider Usha Hollyi   8/7/2019  9:00 AM TANA Garces   8/14/2019 10:00 AM TANA Garces

## 2019-08-02 ENCOUNTER — APPOINTMENT (OUTPATIENT)
Dept: PHYSICAL THERAPY | Age: 54
End: 2019-08-02
Payer: COMMERCIAL

## 2019-08-06 ENCOUNTER — APPOINTMENT (OUTPATIENT)
Dept: PHYSICAL THERAPY | Age: 54
End: 2019-08-06
Payer: COMMERCIAL

## 2019-08-07 ENCOUNTER — HOSPITAL ENCOUNTER (OUTPATIENT)
Dept: PHYSICAL THERAPY | Age: 54
Discharge: HOME | End: 2019-08-07
Payer: COMMERCIAL

## 2019-08-07 PROCEDURE — 97110 THERAPEUTIC EXERCISES: CPT

## 2019-08-07 NOTE — PROGRESS NOTES
Stephanie Alegria  : 1965  Primary: Davina Foster Essentials*  Secondary:  Therapy Center at [facility], [address] [facility]  [phone]   [phone]    OUTPATIENT PHYSICAL THERAPY: Daily Treatment Note 2019  Visit Count:  12    ICD-10: Treatment Diagnosis: Pain in joint, lower leg, left M25.561  Precautions/Allergies:   Bee sting [sting, bee] and Pcn [penicillins]   TREATMENT PLAN:  Effective Dates: 2019 TO 2019 (90 days). Frequency/Duration: 2 times a week for 90 Day(s) MEDICAL/REFERRING DIAGNOSIS:  Status post total knee replacement, left [Z96.652]   DATE OF ONSET: Surgery 2019  REFERRING PHYSICIAN: Arianna Gongora MD MD Orders: Evaluate and treat  Return MD Appointment:      Pre-treatment Symptoms/Complaints:   Pt stated that she fell last Friday coming into the garage, she slipped on a wet floor. Pt does not feel she needs to notify the doctor as she has not had any increase in pain. Pain: Initial: Pain Intensity 1: 0  Pain Location 1: Knee  Pain Orientation 1: Left  Post Session:  0/10   Medications Last Reviewed:  2019  Updated Objective Findings:  Left knee AROM 0-110 degrees, 0-115 PROM  TREATMENT:     Therapeutic Exercise: ( 55):  Exercises per grid below to improve mobility and strength. Required minimal verbal cues to promote proper body alignment. Progressed complexity of movement as indicated.      Date:  2019   Activity/Exercise Parameters   Bike ; Level 4 10 minutes   Heelslides;supine and seated 3x10   Standing SLR flexion, abduction,extension, knee curls 2.5# 3x10 LLE     Prone knee stretch 5 minutes   Knee flexion stretch on step 3 minutes   Calf stretch 3x30''   Hamstring stretch 3x30''   Squats, heel raises - leg press 3x10   Squats 3x10   Lunges 25' x4   Resistance machine extension 30# 3x10   Elliptical Level 1  X 5 minutes       Treatment/Session Summary:    · Response to Treatment:  Pt tolerated session in the gym today preparing her to return to the gym. · Communication/Consultation:  None today  · Equipment provided today:  None today  · Recommendations/Intent for next treatment session: Discharge next visit.      Total Treatment Billable Duration:  55 minutes  PT Patient Time In/Time Out  Time In: 0900  Time Out: [address], PT    Future Appointments   Date Time Provider Usha Ann   8/14/2019 10:00 AM Vicky Ellis, PT [facility]

## 2019-08-14 ENCOUNTER — APPOINTMENT (OUTPATIENT)
Dept: PHYSICAL THERAPY | Age: 54
End: 2019-08-14
Payer: COMMERCIAL

## 2019-08-21 ENCOUNTER — HOSPITAL ENCOUNTER (OUTPATIENT)
Dept: PHYSICAL THERAPY | Age: 54
Discharge: HOME | End: 2019-08-21
Payer: COMMERCIAL

## 2019-08-21 ENCOUNTER — APPOINTMENT (OUTPATIENT)
Dept: PHYSICAL THERAPY | Age: 54
End: 2019-08-21
Payer: COMMERCIAL

## 2019-08-21 PROCEDURE — 97110 THERAPEUTIC EXERCISES: CPT

## 2019-08-21 NOTE — PROGRESS NOTES
Armando Almazan  : 1965  Primary: James Liam Essentials*  Secondary:  Therapy Center at [address]  [phone]   [phone]       OUTPATIENT PHYSICAL THERAPY:Discharge Summary 2019   ICD-10: Treatment Diagnosis: Pain in joint, lower leg, left M25.561  Precautions/Allergies:   Bee sting [sting, bee] and Pcn [penicillins]   TREATMENT PLAN:  Effective Dates: 2019 TO 2019 (90 days). Frequency/Duration: 2 times a week for 90 Day(s) MEDICAL/REFERRING DIAGNOSIS:  Status post total knee replacement, left [Z96.652]   DATE OF ONSET: Surgery 2019  REFERRING PHYSICIAN: Ming Villarreal MD MD Orders: Evaluate and treat  Return MD Appointment:      As of 2019, Armando Almazan has attended 14 out of 14 scheduled visits, with 0 cancellation(s) and 0 no shows. Ms. Kahlil Green has met goals stated below and is planning on returning to the gym and working with a  for overall fitness and safe return to all other gym routines she was previously doing. PROBLEM LIST (Impacting functional limitations):  1. Decreased Strength  2. Decreased Ambulation Ability/Technique  3. Decreased Balance  4. Increased Pain  5. Decreased Flexibility/Joint Mobility INTERVENTIONS PLANNED: (Treatment may consist of any combination of the following)  1. Home Exercise Program (HEP)  2. Manual Therapy  3. Neuromuscular Re-education/Strengthening  4. Therapeutic Exercise/Strengthening     GOALS: (Goals have been discussed and agreed upon with patient.)  Discharge Goals: Time Frame: 90 days  6. Pt to demonstrate decreased disability as per LEFS with a score +5 points. Goal met 19  7. Pt to demonstrate left knee AROM 0-105 , symmetrical with right knee. GOAL MET 19  8. Pt to ride a bicycle for up to 10 minutes without pain/difficulty.   GOAL MET 19    OUTCOME MEASURE:   Tool Used: Lower Extremity Functional Scale (LEFS)  Score:  Initial:36 /80 Most Recent: 77/80 (Date: 8/21/19 )   Interpretation of Score: 20 questions each scored on a 5 point scale with 0 representing \"extreme difficulty or unable to perform\" and 4 representing \"no difficulty\". The lower the score, the greater the functional disability. 80/80 represents no disability. Minimal detectable change is 9 points. UPDATED OBJECTIVE FINDINGS:    Observation/Orthostatic Postural Assessment:  Normal        ROM:     AROM(PROM) Right Left   Knee flexion 105 110   Knee extension 0 0     Strength:     Manual Muscle Test (*/5) Right Left   Knee extension 4 3+   Knee flexion 4 3+   Hip flexion 4 3+   Hip ER 4 3+   Hip IR 4 3+   Hip extension 4 4   Hip abduction 4 4   Hip adduction 4 4   Ankle DF 4 4   Ankle PF 4 4            REASON FOR SERVICES/OTHER COMMENTS:  · Pt to be discharged at this time, has met all goals listed below and has a plan to continue with range of motion and strengthening upon discharge. .   Total Duration:  55 minutes  PT Patient Time In/Time Out  Time In: 1000  Time Out: 1100  Thank you for this referral,  Raya Pruitt, PT

## 2019-08-21 NOTE — PROGRESS NOTES
eLxx Trujillo  : 1965  Primary: Shelbiealesia Orozco Essentials*  Secondary:  Therapy Center at [facility], [address] [facility]  [phone]   [phone]    OUTPATIENT PHYSICAL THERAPY: Daily Treatment Note 2019  Visit Count:  13    ICD-10: Treatment Diagnosis: Pain in joint, lower leg, left M25.561  Precautions/Allergies:   Bee sting [sting, bee] and Pcn [penicillins]   TREATMENT PLAN:  Effective Dates: 2019 TO 2019 (90 days). Frequency/Duration: 2 times a week for 90 Day(s) MEDICAL/REFERRING DIAGNOSIS:  Status post total knee replacement, left [Z96.652]   DATE OF ONSET: Surgery 2019  REFERRING PHYSICIAN: Kwame Mccurdy MD MD Orders: Evaluate and treat  Return MD Appointment:      Pre-treatment Symptoms/Complaints:   Pt has already started to attend the gym more for general fitness and will be seeing a  in a few weeks a few times a week for some time to continue to work on range of motion and strength. Pain: Initial: Pain Intensity 1: 0  Pain Location 1: Knee  Pain Orientation 1: Left  Post Session:  0/10   Medications Last Reviewed:  2019  Updated Objective Findings:  See discharge summary dated 19. TREATMENT:     Therapeutic Exercise: ( 55):  Exercises per grid below to improve mobility and strength. Required minimal verbal cues to promote proper body alignment. Progressed complexity of movement as indicated.      Date:  2019   Activity/Exercise Parameters   Bike ; Level 4 10 minutes   Heelslides;supine and seated 3x10   Standing SLR flexion, abduction,extension, knee curls 2.5# 3x10 LLE     Prone knee stretch 5 minutes   Knee flexion stretch on step 3 minutes   Calf stretch 3x30''   Hamstring stretch 3x30''   Squats, heel raises - leg press 3x10   Squats 3x10   Lunges 25' x4   Resistance machine extension 30# 3x10       Treatment/Session Summary:    · Response to Treatment:  Pt agreeable for discharge. She was grateful for the therapy and content with the goals met. · Recommendations/Intent for next treatment session: Discharge at this time as pt has used up allowable visits. Total Treatment Billable Duration:  55 minutes  PT Patient Time In/Time Out  Time In: 1000  Time Out: [facility] Katlyn Canseco PT    No future appointments.

## 2019-08-21 NOTE — THERAPY DISCHARGE
Dionicio Holter  : 1965  Primary: [facility]*  Secondary:  Therapy Center at [address]  [phone]   [phone]       OUTPATIENT PHYSICAL THERAPY:Discharge Summary 2019   ICD-10: Treatment Diagnosis: Pain in joint, lower leg, left M25.561  Precautions/Allergies:   Bee sting [sting, bee] and Pcn [penicillins]   TREATMENT PLAN:  Effective Dates: 2019 TO 2019 (90 days). Frequency/Duration: 2 times a week for 90 Day(s) MEDICAL/REFERRING DIAGNOSIS:  Status post total knee replacement, left [Z96.652]   DATE OF ONSET: Surgery 2019  REFERRING PHYSICIAN: Truong Desir MD MD Orders: Evaluate and treat  Return MD Appointment:      As of 2019, Dionicio Holter has attended 14 out of 14 scheduled visits, with 0 cancellation(s) and 0 no shows. Ms. Ralph Lopez has met goals stated below and is planning on returning to the gym and working with a  for overall fitness and safe return to all other gym routines she was previously doing. PROBLEM LIST (Impacting functional limitations):  1. Decreased Strength  2. Decreased Ambulation Ability/Technique  3. Decreased Balance  4. Increased Pain  5. Decreased Flexibility/Joint Mobility INTERVENTIONS PLANNED: (Treatment may consist of any combination of the following)  1. Home Exercise Program (HEP)  2. Manual Therapy  3. Neuromuscular Re-education/Strengthening  4. Therapeutic Exercise/Strengthening     GOALS: (Goals have been discussed and agreed upon with patient.)  Discharge Goals: Time Frame: 90 days  6. Pt to demonstrate decreased disability as per LEFS with a score +5 points. Goal met 19  7. Pt to demonstrate left knee AROM 0-105 , symmetrical with right knee. GOAL MET 19  8. Pt to ride a bicycle for up to 10 minutes without pain/difficulty.   GOAL MET 19    OUTCOME MEASURE:   Tool Used: Lower Extremity Functional Scale (LEFS)  Score:  Initial:36 /80 Most Recent: 77/80 (Date: 8/21/19 )   Interpretation of Score: 20 questions each scored on a 5 point scale with 0 representing \"extreme difficulty or unable to perform\" and 4 representing \"no difficulty\". The lower the score, the greater the functional disability. 80/80 represents no disability. Minimal detectable change is 9 points. UPDATED OBJECTIVE FINDINGS:    Observation/Orthostatic Postural Assessment:  Normal        ROM:     AROM(PROM) Right Left   Knee flexion 105 110   Knee extension 0 0     Strength:     Manual Muscle Test (*/5) Right Left   Knee extension 4 3+   Knee flexion 4 3+   Hip flexion 4 3+   Hip ER 4 3+   Hip IR 4 3+   Hip extension 4 4   Hip abduction 4 4   Hip adduction 4 4   Ankle DF 4 4   Ankle PF 4 4            REASON FOR SERVICES/OTHER COMMENTS:  · Pt to be discharged at this time, has met all goals listed below and has a plan to continue with range of motion and strengthening upon discharge. .   Total Duration:  55 minutes  PT Patient Time In/Time Out  Time In: 1000  Time Out: 1100  Thank you for this referral,  Roman Roque, PT

## 2019-08-28 ENCOUNTER — APPOINTMENT (OUTPATIENT)
Dept: PHYSICAL THERAPY | Age: 54
End: 2019-08-28
Payer: COMMERCIAL

## 2021-10-20 ENCOUNTER — HOSPITAL ENCOUNTER (OUTPATIENT)
Dept: PHYSICAL THERAPY | Age: 56
Discharge: HOME | End: 2021-10-20
Payer: COMMERCIAL

## 2021-10-20 DIAGNOSIS — Z96.652 HISTORY OF TOTAL KNEE ARTHROPLASTY, LEFT: ICD-10-CM

## 2021-10-20 DIAGNOSIS — M25.562 ACUTE PAIN OF LEFT KNEE: ICD-10-CM

## 2021-10-20 PROCEDURE — 97161 PT EVAL LOW COMPLEX 20 MIN: CPT

## 2021-10-20 PROCEDURE — 97140 MANUAL THERAPY 1/> REGIONS: CPT

## 2021-10-20 NOTE — PROGRESS NOTES
Russ Miguel Angelas  : 1965  Primary: [facility]*  Secondary:  [facility] [address], [place] [address]  [phone]   [phone]        OUTPATIENT PHYSICAL THERAPY: Daily Treatment Note  10/20/2021    1    ICD-10: Treatment Diagnosis: Stiffness of left knee, not elsewhere classified (M25.662); History of total knee arthroplasty, left [J48.898]    Precautions/Allergies: Allergies   Allergen Reactions    Bee Sting [Sting, Bee] Hives    Pcn [Penicillins] Rash and Itching     redness     Fall Risk Score: 0 (? 5 = High Risk)  MD Orders: evaluate and treat  TREATMENT PLAN:  Effective Dates: 10/20/2021 TO 2022 (90 days).   Frequency/Duration: 2 times a week for 90 Day(s) MEDICAL/REFERRING DIAGNOSIS:Acute pain of left knee [M25.562]  History of total knee arthroplasty, left [Z96.652]  DATE OF ONSET: several months  REFERRING PHYSICIAN:Vi Ozuna MD  RETURN PHYSICIAN APPOINTMENT: did not specify        Pre-treatment Symptoms/Complaints:  Reports that she wants to work on improving her knee ROM  Pain: Initial:   Pain Intensity 1: 0 /10 Post Session:  0/10   Medications Last Reviewed:  10/20/2021    Updated Objective Findings:  See evaluation    TREATMENT:   Therapeutic Exercise: (5 min) (Done in order to improve mobility, strength, function and overall understanding of condition)   Date:  10-20-21   Activity/Exercise Parameters   Education Discussed HEP, plan of care   Quad stretch Prone, reviewed   Hamstring curl Prone,10x   squat trx cables, working on deeper squat     Manual Therapy: (15 min) (Done to improve mobility, reduce tone, guarding and pain)  · Patella distraction mobilization using mini plunger  · Soft tissue mobilization of quad tendon and quad, used cupping with functional mobilizations     Modalities: (-)  MedBridge Portal  Treatment/Session Summary:    · Response to Treatment: patient tolerated the treatment well. Her knee flexion improved by 8 degrees today. Reviewed home program and plan of care . · Communication/Consultation:  None today  · Equipment provided today:  None today  · Recommendations/Intent for next treatment session: Next visit will focus on progressing established plan of care.   Total Treatment Billable Duration:  20 min, evaluation 40 min    PT Patient Time In/Time Out  Time In: 1000  Time Out: [address], PT, DPT

## 2021-10-20 NOTE — THERAPY EVALUATION
Neil Munroe  : 1965  Primary: [facility]*  Secondary:  Therapy Center at [place] [address], [address], [place] [address]  [phone]   [phone]         OUTPATIENT PHYSICAL THERAPY:Initial Assessment 10/20/2021    ICD-10: Treatment Diagnosis: Stiffness of left knee, not elsewhere classified (M25.662); History of total knee arthroplasty, left [H12.481]    Precautions/Allergies: Allergies   Allergen Reactions    Bee Sting [Sting, Bee] Hives    Pcn [Penicillins] Rash and Itching     redness     Fall Risk Score: 0 (? 5 = High Risk)  MD Orders: evaluate and treat  TREATMENT PLAN:  Effective Dates: 10/20/2021 TO 2022 (90 days). Frequency/Duration: 2 times a week for 90 Day(s) MEDICAL/REFERRING DIAGNOSIS:Acute pain of left knee [M25.562]  History of total knee arthroplasty, left [Z96.652]  DATE OF ONSET: several months  REFERRING PHYSICIAN:Zoe Ozuna MD  RETURN PHYSICIAN APPOINTMENT: did not specify       ASSESSMENT:  Ms. Spencer Barboza presents to physical therapy with symptoms of left knee tightness . The examination reveals left knee tightness with a firm end feel, mild hip and quad wkns and abrupt heel strike during gait . The examination is of low complexity due to a stable clinical presentation. Skilled physical therapy is recommended to progress the plan of care in order for the patient to return to full function with minimal symptoms. PROBLEM LIST (Impacting functional limitations):  1. Left knee tightness  2. Quad and hip wkns   3. Antalgic gait   4. Unable to cycle INTERVENTIONS PLANNED:  1. Home Exercise Program (HEP)  2. Manual Therapy  3. Therapeutic Exercise/Strengthening  4. Cryotherapy w/ vaso-pneumatic compression  5. Dry needling      GOALS: (Goals have been discussed and agreed upon with patient.)  SHORT-TERM FUNCTIONAL GOALS: Time Frame: 2-4 wks  1. Patient will report 25-50% reduction in overall symptoms  2. Patient will be compliant with HEP and plan of care  3. Patient will improved knee flexion by 15-20 deg  4. Patient will improve on the LEFS by 3-5 points  DISCHARGE GOALS: Time Frame: 6-8 wk  1. Patient will report % reduction in overall symptoms in order to be able to have full function with decreased symptoms  2. Patient will report feeling comfortable progressing their own plan of care with the home program prescribed  3. Patient will report being able to cycle with minimal irritation   4. Patient will improve on the LEFS by 8-10 points in order to demonstrate improved function with less pain    Rehabilitation Potential For Stated Goals: [address] therapy, I certify that the treatment plan above will be carried out by a therapist or under their direction. Thank you for this referral,  Tip Groves PT,DPT              HISTORY:   History of Present Injury/Illness (Reason for Referral): Reports that she has had both knees replaced. Right was done 10 yrs ago and left was 1.5 yrs ago. She initially did well with rehab with her left knee but during covid and do to helping take care of her , she felt like she lost ROM to the point where she is unable to ride her bike anymore.  Her goal is to be able to ride her bike again without limitations   Past Medical History/Comorbidities:   Past Medical History:   Diagnosis Date    Arthritis     knees    HDL deficiency     managed with medication    Hypertension     Rheumatic fever     Scarlet fever as a child     Social History/Living Environment: lives in a house, bedroom on first level  Prior Level of Function/Work/Activity:independent  Current Medications:       Current Outpatient Medications:     diclofenac EC (VOLTAREN) 75 mg EC tablet, Take 75 mg by mouth two (2) times a day., Disp: , Rfl:     CALCIUM CARB-VIT D2-MINERALS PO, Take 1 Tab by mouth two (2) times a day., Disp: , Rfl:     hydroCHLOROthiazide (HYDRODIURIL) 25 mg tablet, Take 25 mg by mouth daily. , Disp: , Rfl:     diphenhydrAMINE-acetaminophen (TYLENOL PM EXTRA STRENGTH)  mg tab, Take 2 Tabs by mouth every six (6) hours as needed for Pain., Disp: , Rfl:     traMADol (ULTRAM) 50 mg tablet, Take 50 mg by mouth every six (6) hours as needed for Pain. Indications: Pain, Disp: , Rfl:     vit A/vit C/vit E/zinc/copper (ICAPS AREDS PO), Take 1 Cap by mouth two (2) times a day., Disp: , Rfl:     LOSARTAN PO, Take 100 mg by mouth daily. , Disp: , Rfl:     atorvastatin (LIPITOR) 10 mg tablet, Take 10 mg by mouth nightly., Disp: , Rfl:     diclofenac (VOLTAREN) 1 % gel, Apply 2 g to affected area four (4) times daily. , Disp: , Rfl:     calcium carb-vit d2-minerals (CALTRATE PLUS) Tab, Take 1 Tab by mouth two (2) times a day., Disp: , Rfl:     acyclovir (ZOVIRAX) 400 mg tablet, Take 400 mg by mouth daily. Takes in am , Disp: , Rfl:     phentermine (ADIPEX-P) 37.5 mg tablet, Take 37.5 mg by mouth every morning., Disp: , Rfl:   Date Last Reviewed: 10/20/2021     Ambulatory/Rehab Services H2 Model Falls Risk Assessment    Risk Factors:       No Risk Factors Identified Ability to Rise from Chair:       (0)  Ability to rise in a single movement    Falls Prevention Plan:       No modifications necessary   Total: (5 or greater = High Risk): 0    ©2010 [facility] [address] [place] [address] States Patent #[id number].  Federal Law prohibits the replication, distribution or use without written permission from [facility] [employer]      Number of Personal Factors/Comorbidities that affect the Plan of Care: 0: LOW COMPLEXITY   EXAMINATION:   (Abbreviations: P-pain, NP- no pain, wnl-within normal limits)  Observation/Orthostatic Postural Assessment:    Relaxed standing posture:  · Posterior tilt, external rotation of the femur   · Bilateral bunion     Palpation/tone/tissue texture:    ASIS: symmetrical   PSIS: symmetrical   Leg Length: supine: symmetrical        Long Sitting: symmetrical       Hip Position: -    Soft tissue:  · Hip flexors: increased quad tightness on L side  · Hamstrings:within normal limits bilateral   · IT band: n/a  · gastroc/soleus/posterior tibialis: mild tightness bilateral      Foot Exam Date:  10/20/2021       Left Right   Talocrural Joint: wnl wnl   Rear foot: (neutral to relaxed: 4-6                      deg-normal) n/a n/a   Mid-foot (navicular drop, <7-normal) - -   Forefoot: (position, mobility) - -   First ray position/hallux limitus test: 1st MTP ROM,wnl 1st MTP ROM, wnl   Windlass test: n/a n/a   Foot intrinsic strength:  n/a n/a         ROM:   Knee ROM Date:  10/20/2021       Right Left   Flexion 110 82   Extension 0 0      Hip ROM Date:  10/20/2021       Right Left   Flexion wnl wnl   Extension wnl wnl   IR wnl wnl   ER wnl wnl           Strength:     Hip Strength Date:  10/20/2021       Right Left   Flexion 5 5   Extension 5 5   IR n/a n/a   ER n/a n/a   Abduction 5 4      Knee Strength Date:  10/20/2021       Right Left   Flexion 5 4   Extension 5 4             Special Tests: n/a    Neurological Screen:   Myotomes: strong in bilateral LE's     Dermatomes: n/a         Reflexes: n/a         Neural Tension Tests: n/a  Functional Mobility:    · Double leg squat: able to do a functional squat  · Single leg squat:  L: able to do a partial squat         R: able to do a partial squat  · Gait Pattern:abrupt heel strike bilateral, decreased inonimate and trunk and mobility, decreased terminal stance control on R side   Balance:  Single leg   L: n/a  R: n/a          Body Structures Involved:  1. Joints  2. Muscles  3. Ligaments Body Functions Affected:  1. Sensory/Pain  2. Neuromusculoskeletal  3. Movement Related Activities and Participation Affected:  1. General Tasks and Demands  2.  Mobility  3. cycling   Number of elements that affect the Plan of Care: 3: MODERATE COMPLEXITY   CLINICAL PRESENTATION:   Presentation: Stable and uncomplicated: LOW COMPLEXITY   CLINICAL DECISION MAKING:   Outcome Measure: Tool Used: Lower Extremity Functional Scale (LEFS)  Score:  Initial: 69/80 Most Recent: X/80 (Date: -- )   Interpretation of Score: 20 questions each scored on a 5 point scale with 0 representing \"extreme difficulty or unable to perform\" and 4 representing \"no difficulty\". The lower the score, the greater the functional disability. 80/80 represents no disability. Minimal detectable change is 9 points. Medical Necessity:   · Patient is expected to demonstrate progress in strength, range of motion and symptom levels to return to full function. Reason for Services/Other Comments:  · Patient continues to require skilled intervention due to ongoing symptoms. Use of outcome tool(s) and clinical judgement create a POC that gives a: Questionable prediction of patient's progress: MODERATE COMPLEXITY       · Pain: Initial:   0/10 at rest, just feels tightness Post Session:  0/10 ·   Compliance with Program/Exercises: Will assess as treatment progresses. · Recommendations/Intent for next treatment session: \"Next visit will focus on progressing the patients plan of care\".     Future Appointments   Date Time Provider Usha Ann   10/29/2021 10:15 AM Michelle Kingston PT, DPT [facility]   11/1/2021  3:15 PM Michelle Kingston PT DPT [facility]   11/4/2021 10:30 AM Michelle Kingston PT, DPT [facility]   11/10/2021  4:00 PM Michelle Kingston PT, DPT [facility]   11/16/2021  3:45 PM Michelle Kingston PT DPT [facility]   11/19/2021 11:00 AM Michelle Kingston PT, DPT [facility]     Total Treatment Duration: 20 min, evaluation 40 min  PT Patient Time In/Time Out  Time In: 1000  Time Out: 382 Lay Garcia PT DPT

## 2021-10-29 ENCOUNTER — HOSPITAL ENCOUNTER (OUTPATIENT)
Dept: PHYSICAL THERAPY | Age: 56
Discharge: HOME | End: 2021-10-29
Payer: COMMERCIAL

## 2021-10-29 PROCEDURE — 97140 MANUAL THERAPY 1/> REGIONS: CPT

## 2021-10-29 PROCEDURE — 97110 THERAPEUTIC EXERCISES: CPT

## 2021-10-29 NOTE — PROGRESS NOTES
Thien Mike  : 1965  Primary: [facility]oss[facility]ises [facility]*  Secondary:  [facility], [address], [place] [address]  [phone]   [phone]        OUTPATIENT PHYSICAL THERAPY: Daily Treatment Note  10/29/2021    2    ICD-10: Treatment Diagnosis: Stiffness of left knee, not elsewhere classified (M25.662); History of total knee arthroplasty, left [T88.675]    Precautions/Allergies: Allergies   Allergen Reactions    Bee Sting [Sting, Bee] Hives    Pcn [Penicillins] Rash and Itching     redness     Fall Risk Score: 0 (? 5 = High Risk)  MD Orders: evaluate and treat  TREATMENT PLAN:  Effective Dates: 10/20/2021 TO 2022 (90 days). Frequency/Duration: 2 times a week for 90 Day(s) MEDICAL/REFERRING DIAGNOSIS:Acute pain of left knee [M25.562]  History of total knee arthroplasty, left [Z96.652]  DATE OF ONSET: several months  REFERRING PHYSICIAN:Carleen Ozuna MD  RETURN PHYSICIAN APPOINTMENT: did not specify        Pre-treatment Symptoms/Complaints:  Reports that she has been working on her ROM at the gym. Also reports that she got kicked by a horse in her R quad causing moderate bruising.    Pain: Initial:   Pain Intensity 1: 0 /10 Post Session:  0/10   Medications Last Reviewed:  10/29/2021    Updated Objective Findings:  0-90 deg knee ROM   TREATMENT:   Therapeutic Exercise: (25 min) (Done in order to improve mobility, strength, function and overall understanding of condition)   Date:  10-20-21 Date  10-29-21   Activity/Exercise Parameters    Education Discussed HEP, plan of care Discussed HEP   Nu step  10 min, max flexion motion    Quad stretch Prone, reviewed Prone, contract relax with progressive increase in flexion   Hamstring curl Prone,10x 10x, 3 sets, 5lbs, in prone   shuttle  75lbs, double leg, max motion, 10x, 3 sets   squat trx cables, working on deeper squat trx cables, 10x, 3 sets, with forward weight shift Manual Therapy: (20 min) (Done to improve mobility, reduce tone, guarding and pain)  · Patella distraction mobilization using mini plunger  · Soft tissue mobilization of quad tendon and quad, used cupping with functional mobilizations   · Anterior tibial mobilization while knee is in max flexion    Modalities: (-)  MedBridge Portal  Treatment/Session Summary:    · Response to Treatment: patient tolerated the treatment well. Her knee flexion improved to 95 deg after the session. Reviewed her home program.   · Communication/Consultation:  None today  · Equipment provided today:  None today  · Recommendations/Intent for next treatment session: Next visit will focus on progressing established plan of care.   Total Treatment Billable Duration:  Manual-20 min, TE-25 min    PT Patient Time In/Time Out  Time In: 1020  Time Out: 1110    Janes Olguin PT, DPT

## 2021-11-01 ENCOUNTER — HOSPITAL ENCOUNTER (OUTPATIENT)
Dept: PHYSICAL THERAPY | Age: 56
Discharge: HOME | End: 2021-11-01
Payer: COMMERCIAL

## 2021-11-01 PROCEDURE — 97140 MANUAL THERAPY 1/> REGIONS: CPT

## 2021-11-01 PROCEDURE — 97110 THERAPEUTIC EXERCISES: CPT

## 2021-11-01 NOTE — PROGRESS NOTES
Lew Unger  : 1965  Primary: Agustin Mayra [facility]entials*  Secondary:  [facility] Center at [facility]  [address] , [address], [place]  [phone]   [phone]        OUTPATIENT PHYSICAL THERAPY: Daily Treatment Note  2021    3    ICD-10: Treatment Diagnosis: Stiffness of left knee, not elsewhere classified (M25.662); History of total knee arthroplasty, left [E24.228]    Precautions/Allergies: Allergies   Allergen Reactions    Bee Sting [Sting, Bee] Hives    Pcn [Penicillins] Rash and Itching     redness     Fall Risk Score: 0 (? 5 = High Risk)  MD Orders: evaluate and treat  TREATMENT PLAN:  Effective Dates: 10/20/2021 TO 2022 (90 days). Frequency/Duration: 2 times a week for 90 Day(s) MEDICAL/REFERRING DIAGNOSIS:Acute pain of left knee [M25.562]  History of total knee arthroplasty, left [Z96.652]  DATE OF ONSET: several months  REFERRING PHYSICIAN:Daryl Ozuna MD  RETURN PHYSICIAN APPOINTMENT: did not specify        Pre-treatment Symptoms/Complaints:  Reports that she was a little sore after last session but it only lasted one day.    Pain: Initial:   Pain Intensity 1: 0 /10 Post Session:  0/10   Medications Last Reviewed:  2021    Updated Objective Findings:  0-93 deg knee ROM   TREATMENT:   Therapeutic Exercise: (25 min) (Done in order to improve mobility, strength, function and overall understanding of condition)   Date:  10-20-21 Date  10-29-21 Date  21   Activity/Exercise Parameters     Education Discussed HEP, plan of care Discussed HEP Discussed updated HEP   Nu step  10 min, max flexion motion  10 min, max flexion   Quad stretch Prone, reviewed Prone, contract relax with progressive increase in flexion Prone, contract relax with progressive increase in flexion   Hamstring curl Prone,10x 10x, 3 sets, 5lbs, in prone · 10x, 3 sets, 7lbs  · Standing curls: 10x   shuttle  75lbs, double leg, max motion, 10x, 3 sets Shuttle, 75lbs, max flexion, 10x, 3 sets   squat trx cables, working on deeper squat trx cables, 10x, 3 sets, with forward weight shift trx cables, 10x, 3 sets, with forward weight shift     Manual Therapy: (20 min) (Done to improve mobility, reduce tone, guarding and pain)  · Patella distraction mobilization using mini plunger  · Soft tissue mobilization of quad tendon and quad, used cupping with functional mobilizations   · Anterior tibial mobilization while knee is in max flexion    Modalities: (-)  MedBridge Portal  Treatment/Session Summary:    · Response to Treatment: patient tolerated the treatment well. Her knee flexion improved to 97 deg at the end of the session in supine and 100 deg in sitting. Discussed to continue pushing herself for more motion. · Communication/Consultation:  None today  · Equipment provided today:  None today  · Recommendations/Intent for next treatment session: Next visit will focus on progressing established plan of care.   Total Treatment Billable Duration:  Manual-20 min, TE-25 min    PT Patient Time In/Time Out  Time In: 1246  Time Out: 220 Fatemeh Morocho, PT, DPT

## 2021-11-04 ENCOUNTER — HOSPITAL ENCOUNTER (OUTPATIENT)
Dept: PHYSICAL THERAPY | Age: 56
Discharge: HOME | End: 2021-11-04
Payer: COMMERCIAL

## 2021-11-04 PROCEDURE — 97110 THERAPEUTIC EXERCISES: CPT

## 2021-11-04 PROCEDURE — 97140 MANUAL THERAPY 1/> REGIONS: CPT

## 2021-11-04 NOTE — PROGRESS NOTES
Dawnchun Safer  : 1965  Primary: [facility]*  Secondary:  Therapy Center at [facility]  [address], [address], [address]  [phone]   [phone]        OUTPATIENT PHYSICAL THERAPY: Daily Treatment Note  2021    4    ICD-10: Treatment Diagnosis: Stiffness of left knee, not elsewhere classified (M25.662); History of total knee arthroplasty, left [Y45.484]    Precautions/Allergies: Allergies   Allergen Reactions    Bee Sting [Sting, Bee] Hives    Pcn [Penicillins] Rash and Itching     redness     Fall Risk Score: 0 (? 5 = High Risk)  MD Orders: evaluate and treat  TREATMENT PLAN:  Effective Dates: 10/20/2021 TO 2022 (90 days). Frequency/Duration: 2 times a week for 90 Day(s) MEDICAL/REFERRING DIAGNOSIS:Acute pain of left knee [M25.562]  History of total knee arthroplasty, left [Z96.652]  DATE OF ONSET: several months  REFERRING PHYSICIAN:Joseph Ozuna MD  RETURN PHYSICIAN APPOINTMENT: did not specify        Pre-treatment Symptoms/Complaints:  Reports that she is feeling better. States that she feels like her motion is improving.   Pain: Initial:   Pain Intensity 1: 0 /10 Post Session:  0/10   Medications Last Reviewed:  2021    Updated Objective Findings:  0-95 deg knee ROM   TREATMENT:   Therapeutic Exercise: (25 min) (Done in order to improve mobility, strength, function and overall understanding of condition)   Date  10-29-21 Date  21 Date  21   Activity/Exercise      Education Discussed HEP Discussed updated HEP Discussed HEP   Nu step 10 min, max flexion motion  10 min, max flexion 10 min, flexion   Quad stretch Prone, contract relax with progressive increase in flexion Prone, contract relax with progressive increase in flexion Prone, contract relax with progressive increase in flexion   Hamstring curl 10x, 3 sets, 5lbs, in prone · 10x, 3 sets, 7lbs  · Standing curls: 10x · 10x, 3 sets, 7lbs  · Standing curls: 10x   shuttle 75lbs, double leg, max motion, 10x, 3 sets Shuttle, 75lbs, max flexion, 10x, 3 sets -   squat trx cables, 10x, 3 sets, with forward weight shift trx cables, 10x, 3 sets, with forward weight shift trx cables, 10x, 3 sets, with forward weight shift     Manual Therapy: (20 min) (Done to improve mobility, reduce tone, guarding and pain)  · Patella distraction mobilization using mini plunger  · Soft tissue mobilization of quad tendon and quad with functional mobilizations   · Anterior tibial mobilization while knee is in max flexion    Modalities: (-)  MedBridge Portal  Treatment/Session Summary:    · Response to Treatment: patient tolerated the treatment well. She had 97 deg of knee flexion at the end of the session. Reviewed her home program.   · Communication/Consultation:  None today  · Equipment provided today:  None today  · Recommendations/Intent for next treatment session: Next visit will focus on progressing established plan of care.   Total Treatment Billable Duration:  Manual-20 min, TE-25 min    PT Patient Time In/Time Out  Time In: 1030  Time Out: Brandan Lindsay, PT, DPT

## 2021-11-10 ENCOUNTER — HOSPITAL ENCOUNTER (OUTPATIENT)
Dept: PHYSICAL THERAPY | Age: 56
Discharge: HOME | End: 2021-11-10
Payer: COMMERCIAL

## 2021-11-10 PROCEDURE — 97140 MANUAL THERAPY 1/> REGIONS: CPT

## 2021-11-10 PROCEDURE — 97110 THERAPEUTIC EXERCISES: CPT

## 2021-11-10 NOTE — PROGRESS NOTES
Elsi Normal  : 1965  Primary: Parisa Paci Essentials*  Secondary:  [facility]  [address], [facility]  [phone]   [phone]        OUTPATIENT PHYSICAL THERAPY: Daily Treatment Note  11/10/2021    5    ICD-10: Treatment Diagnosis: Stiffness of left knee, not elsewhere classified (M25.662); History of total knee arthroplasty, left [O54.127]    Precautions/Allergies: Allergies   Allergen Reactions    Bee Sting [Sting, Bee] Hives    Pcn [Penicillins] Rash and Itching     redness     Fall Risk Score: 0 (? 5 = High Risk)  MD Orders: evaluate and treat  TREATMENT PLAN:  Effective Dates: 10/20/2021 TO 2022 (90 days). Frequency/Duration: 2 times a week for 90 Day(s) MEDICAL/REFERRING DIAGNOSIS:Acute pain of left knee [M25.562]  History of total knee arthroplasty, left [Z96.652]  DATE OF ONSET: several months  REFERRING PHYSICIAN:Eliezer Ozuna MD  RETURN PHYSICIAN APPOINTMENT: did not specify        Pre-treatment Symptoms/Complaints:  Reports that she is doing well. States that she is noticing knee range improvements.   Pain: Initial:   Pain Intensity 1: 0 /10 Post Session:  0/10   Medications Last Reviewed:  11/10/2021    Updated Objective Findings:  0-95 deg knee ROM   TREATMENT:   Therapeutic Exercise: (25 min) (Done in order to improve mobility, strength, function and overall understanding of condition)   Date  10-29-21 Date  21 Date  21 Date  11-10-21   Activity/Exercise       Education Discussed HEP Discussed updated HEP Discussed HEP Discussed HEP   Nu step 10 min, max flexion motion  10 min, max flexion 10 min, flexion 10 min, flexion   Quad stretch Prone, contract relax with progressive increase in flexion Prone, contract relax with progressive increase in flexion Prone, contract relax with progressive increase in flexion Prone, contract relax with progressive increase in flexion   Hamstring curl 10x, 3 sets, 5lbs, in prone · 10x, 3 sets, 7lbs  · Standing curls: 10x · 10x, 3 sets, 7lbs  · Standing curls: 10x · Prone and supine, manual resistance applied   shuttle 75lbs, double leg, max motion, 10x, 3 sets Shuttle, 75lbs, max flexion, 10x, 3 sets - SL, 75lbs, 10x, 3 sets   squat trx cables, 10x, 3 sets, with forward weight shift trx cables, 10x, 3 sets, with forward weight shift trx cables, 10x, 3 sets, with forward weight shift · Trx cables with anterior force from band, 10x, 3 sets  · Step down, 10x, 3 sets     Manual Therapy: (20 min) (Done to improve mobility, reduce tone, guarding and pain)  · Patella distraction mobilization using mini plunger  · Soft tissue mobilization of quad tendon and quad with functional mobilizations   · Anterior tibial mobilization while knee is in max flexion    Modalities: (-)  MedBridge Portal  Treatment/Session Summary:    · Response to Treatment: patient tolerated the treatment well. She had 98 deg of knee flexion at the end of the session. Reviewed her home program and plan of care. · Communication/Consultation:  None today  · Equipment provided today:  None today  · Recommendations/Intent for next treatment session: Next visit will focus on progressing established plan of care.   Total Treatment Billable Duration:  Manual-20 min, TE-25 min    PT Patient Time In/Time Out  Time In: 1600  Time Out: 1645    Katiuska Vincent, PT, DPT

## 2021-11-16 ENCOUNTER — HOSPITAL ENCOUNTER (OUTPATIENT)
Dept: PHYSICAL THERAPY | Age: 56
Discharge: HOME | End: 2021-11-16
Payer: COMMERCIAL

## 2021-11-16 PROCEDURE — 97140 MANUAL THERAPY 1/> REGIONS: CPT

## 2021-11-16 PROCEDURE — 97110 THERAPEUTIC EXERCISES: CPT

## 2021-11-16 NOTE — PROGRESS NOTES
Nghia Hathaway  : 1965  Primary: Remy Quinby Essentials*  Secondary:  [facility] [address] [place] [address], [address], [facility] [address]  [phone]   [phone]        OUTPATIENT PHYSICAL THERAPY: Daily Treatment Note  2021    6    ICD-10: Treatment Diagnosis: Stiffness of left knee, not elsewhere classified (M25.662); History of total knee arthroplasty, left [Q47.724]    Precautions/Allergies: Allergies   Allergen Reactions    Bee Sting [Sting, Bee] Hives    Pcn [Penicillins] Rash and Itching     redness     Fall Risk Score: 0 (? 5 = High Risk)  MD Orders: evaluate and treat  TREATMENT PLAN:  Effective Dates: 10/20/2021 TO 2022 (90 days). Frequency/Duration: 2 times a week for 90 Day(s) MEDICAL/REFERRING DIAGNOSIS:Acute pain of left knee [M25.562]  History of total knee arthroplasty, left [Z96.652]  DATE OF ONSET: several months  REFERRING PHYSICIAN:Reid Ozuna MD  RETURN PHYSICIAN APPOINTMENT: did not specify        Pre-treatment Symptoms/Complaints:  Reports that she is doing well.  States she had increased   Pain: Initial:   Pain Intensity 1: 0 /10 Post Session:  0/10   Medications Last Reviewed:  2021    Updated Objective Findings:  0-95 deg knee ROM   TREATMENT:   Therapeutic Exercise: (25 min) (Done in order to improve mobility, strength, function and overall understanding of condition)   Date  21 Date  11-10-21 Date  21   Activity/Exercise      Education Discussed HEP Discussed HEP Discussed HEP   Nu step 10 min, flexion 10 min, flexion 10 min, flexion   Quad stretch Prone, contract relax with progressive increase in flexion Prone, contract relax with progressive increase in flexion Prone, contract relax with progressive increase in flexion   Hamstring curl · 10x, 3 sets, 7lbs  · Standing curls: 10x · Prone and supine, manual resistance applied · Prone and supine, manual resistance applied   shuttle - SL, 75lbs, 10x, 3 sets -   squat trx cables, 10x, 3 sets, with forward weight shift · Trx cables with anterior force from band, 10x, 3 sets  · Step down, 10x, 3 sets · Trx cables with anterior force from band, 10x, 3 sets, anterior tibial force provided   · Step down, 10x, 3 sets   Step over  ·  Over a band, 10x, 2 sets     Manual Therapy: (20 min) (Done to improve mobility, reduce tone, guarding and pain)  · Patella distraction mobilization using mini plunger  · Soft tissue mobilization of quad tendon and quad with functional mobilizations   · Anterior tibial mobilization while knee is in max flexion    Modalities: (to help with hematoma)   Ultrasound, 5 min, continuous, 1.4 to L thigh  MedBridge Portal  Treatment/Session Summary:    · Response to Treatment: patient tolerated the treatment well. She had 98 deg of knee flexion at the end of the session. Her hematoma seemed some better after the ultrasound. · Communication/Consultation:  None today  · Equipment provided today:  None today  · Recommendations/Intent for next treatment session: Next visit will focus on progressing established plan of care.   Total Treatment Billable Duration:  Manual-20 min, TE-25 min    PT Patient Time In/Time Out  Time In: 5299  Time Out: 9891    Nilsa Garcia, PT, DPT

## 2021-11-19 ENCOUNTER — HOSPITAL ENCOUNTER (OUTPATIENT)
Dept: PHYSICAL THERAPY | Age: 56
Discharge: HOME | End: 2021-11-19
Payer: COMMERCIAL

## 2021-11-19 PROCEDURE — 97140 MANUAL THERAPY 1/> REGIONS: CPT

## 2021-11-19 PROCEDURE — 97110 THERAPEUTIC EXERCISES: CPT

## 2021-11-19 NOTE — PROGRESS NOTES
Katty Rivera  : 1965  Primary: Dante Colunga Essentials*  Secondary:  [facility]  [address] , [address], [place] [address]  [phone]   [phone]        OUTPATIENT PHYSICAL THERAPY: Daily Treatment Note  2021    7    ICD-10: Treatment Diagnosis: Stiffness of left knee, not elsewhere classified (M25.662); History of total knee arthroplasty, left [Z85.485]    Precautions/Allergies: Allergies   Allergen Reactions    Bee Sting [Sting, Bee] Hives    Pcn [Penicillins] Rash and Itching     redness     Fall Risk Score: 0 (? 5 = High Risk)  MD Orders: evaluate and treat  TREATMENT PLAN:  Effective Dates: 10/20/2021 TO 2022 (90 days). Frequency/Duration: 2 times a week for 90 Day(s) MEDICAL/REFERRING DIAGNOSIS:Acute pain of left knee [M25.562]  History of total knee arthroplasty, left [Z96.652]  DATE OF ONSET: several months  REFERRING PHYSICIAN:Lamonte Ozuna MD  RETURN PHYSICIAN APPOINTMENT: did not specify        Pre-treatment Symptoms/Complaints:  Reports that she is doing well.  States she had increased   Pain: Initial:     0/10 Post Session:  0/10   Medications Last Reviewed:  2021    Updated Objective Findings:  0-97 deg knee ROM   TREATMENT:   Therapeutic Exercise: (25 min) (Done in order to improve mobility, strength, function and overall understanding of condition)   Date  11-10-21 Date  21 Date  21   Activity/Exercise      Education Discussed HEP Discussed HEP Discussed HEP   Nu step 10 min, flexion 10 min, flexion 10 min, flexion    Quad stretch Prone, contract relax with progressive increase in flexion Prone, contract relax with progressive increase in flexion Prone, contract relax with progressive increase in flexion   Hamstring curl · Prone and supine, manual resistance applied · Prone and supine, manual resistance applied · Prone and supine, manual resistance applied   shuttle SL, 75lbs, 10x, 3 sets -    squat · Trx cables with anterior force from band, 10x, 3 sets  · Step down, 10x, 3 sets · Trx cables with anterior force from band, 10x, 3 sets, anterior tibial force provided   · Step down, 10x, 3 sets · Trx cables with anterior force from band, 10x, 3 sets, anterior tibial force provided    Step over ·  Over a band, 10x, 2 sets Over a band, 20x   Quadruped rock backs ·   Band around femur: 20x     Manual Therapy: (20 min) (Done to improve mobility, reduce tone, guarding and pain)  · Patella distraction mobilization using mini plunger  · Soft tissue mobilization of quad tendon and quad with functional mobilizations   · Anterior tibial mobilization while knee is in max flexion    Modalities: (to help with hematoma)   Ultrasound, 5 min, continuous, 1.4 to L thigh  MedBridge Portal  Treatment/Session Summary:    · Response to Treatment: patient tolerated the treatment well but we were still unable to get more than 98 deg. Reviewed home program and plan of care. · Communication/Consultation:  None today  · Equipment provided today:  None today  · Recommendations/Intent for next treatment session: Next visit will focus on progressing established plan of care.   Total Treatment Billable Duration:  Manual-20 min, TE-25 min    PT Patient Time In/Time Out  Time In: 1115  Time Out: 1210    Gloria Delaney, PT, DPT

## 2021-12-02 ENCOUNTER — HOSPITAL ENCOUNTER (OUTPATIENT)
Dept: PHYSICAL THERAPY | Age: 56
Discharge: HOME | End: 2021-12-02
Payer: COMMERCIAL

## 2021-12-02 PROCEDURE — 97140 MANUAL THERAPY 1/> REGIONS: CPT

## 2021-12-02 PROCEDURE — 97110 THERAPEUTIC EXERCISES: CPT

## 2021-12-02 NOTE — PROGRESS NOTES
Lisa Geronimo  : 1965  Primary: Rashel Haas Essentials*  Secondary:  [facility] at [facility]  [address], [address], [place] [address]  [phone]   [phone]        OUTPATIENT PHYSICAL THERAPY: Daily Treatment Note  2021    8    ICD-10: Treatment Diagnosis: Stiffness of left knee, not elsewhere classified (M25.662); History of total knee arthroplasty, left [L34.633]    Precautions/Allergies: Allergies   Allergen Reactions    Bee Sting [Sting, Bee] Hives    Pcn [Penicillins] Rash and Itching     redness     Fall Risk Score: 0 (? 5 = High Risk)  MD Orders: evaluate and treat  TREATMENT PLAN:  Effective Dates: 10/20/2021 TO 2022 (90 days). Frequency/Duration: 2 times a week for 90 Day(s) MEDICAL/REFERRING DIAGNOSIS:Acute pain of left knee [M25.562]  History of total knee arthroplasty, left [Z96.652]  DATE OF ONSET: several months  REFERRING PHYSICIAN:Venkata Ozuna MD  RETURN PHYSICIAN APPOINTMENT: did not specify        Pre-treatment Symptoms/Complaints:  Reports that she is doing well. States she has been working out with her , Gordy Ordoñez.    Pain: Initial:   Pain Intensity 1: 0 0/10 Post Session:  0/10   Medications Last Reviewed:  2021    Updated Objective Findings:  0-97 deg knee ROM   TREATMENT:   Therapeutic Exercise: (30 min) (Done in order to improve mobility, strength, function and overall understanding of condition)   Date  11-10-21 Date  21 Date  21 Date  21   Activity/Exercise       Education Discussed HEP Discussed HEP Discussed HEP Discussed HEP   Nu step 10 min, flexion 10 min, flexion 10 min, flexion  10 min, flexion    Quad stretch Prone, contract relax with progressive increase in flexion Prone, contract relax with progressive increase in flexion Prone, contract relax with progressive increase in flexion Prone and abbi position, contract relax with progressive increase in flexion   Hamstring curl · Prone and supine, manual resistance applied · Prone and supine, manual resistance applied · Prone and supine, manual resistance applied · Prone and supine, manual resistance applied, focused on keeping end range flexion   shuttle SL, 75lbs, 10x, 3 sets -  -   squat · Trx cables with anterior force from band, 10x, 3 sets  · Step down, 10x, 3 sets · Trx cables with anterior force from band, 10x, 3 sets, anterior tibial force provided   · Step down, 10x, 3 sets · Trx cables with anterior force from band, 10x, 3 sets, anterior tibial force provided  · In II bars, anterior tibia force given, 10x, 3 sets   Step over ·  Over a band, 10x, 2 sets Over a band, 20x 10\",    Quadruped rock backs ·   Band around femur: 20x Band around femur, 20x     Manual Therapy: (20 min) (Done to improve mobility, reduce tone, guarding and pain)  · Patella distraction mobilization using mini plunger  · Soft tissue mobilization of quad tendon and quad with functional mobilizations   · Anterior tibial mobilization while knee is in max flexion    Modalities: (to help with hematoma)   -  [facility]  Treatment/Session Summary:    · Response to Treatment: patient tolerated the treatment well; however, I am still unable to get much more than 98 deg afterwards. Discussed taping for edema of her R thigh hematoma. · Communication/Consultation:  None today  · Equipment provided today:  None today  · Recommendations/Intent for next treatment session: Next visit will focus on progressing established plan of care.   Total Treatment Billable Duration:  Manual-20 min, TE-30 min    PT Patient Time In/Time Out  Time In: 4526  Time Out: 92257 Tavon Tubbs, PT, DPT

## 2021-12-07 ENCOUNTER — HOSPITAL ENCOUNTER (OUTPATIENT)
Dept: PHYSICAL THERAPY | Age: 56
Discharge: HOME | End: 2021-12-07
Payer: COMMERCIAL

## 2021-12-07 PROCEDURE — 97110 THERAPEUTIC EXERCISES: CPT

## 2021-12-07 PROCEDURE — 97140 MANUAL THERAPY 1/> REGIONS: CPT

## 2021-12-07 NOTE — PROGRESS NOTES
Lakia Garrett  : 1965  Primary: Aurora Larsen Essentials*  Secondary:  [facility] at [facility]  [address], [address], [place] [address]  [phone]   [phone]        OUTPATIENT PHYSICAL THERAPY: Daily Treatment Note  2021    9    ICD-10: Treatment Diagnosis: Stiffness of left knee, not elsewhere classified (M25.662); History of total knee arthroplasty, left [B58.031]    Precautions/Allergies: Allergies   Allergen Reactions    Bee Sting [Sting, Bee] Hives    Pcn [Penicillins] Rash and Itching     redness     Fall Risk Score: 0 (? 5 = High Risk)  MD Orders: evaluate and treat  TREATMENT PLAN:  Effective Dates: 10/20/2021 TO 2022 (90 days). Frequency/Duration: 2 times a week for 90 Day(s) MEDICAL/REFERRING DIAGNOSIS:Acute pain of left knee [M25.562]  History of total knee arthroplasty, left [Z96.652]  DATE OF ONSET: several months  REFERRING PHYSICIAN:Corrine Ozuna MD  RETURN PHYSICIAN APPOINTMENT: did not specify        Pre-treatment Symptoms/Complaints:  Reports that she is doing well. Notes her  put a massage gun on her hamstring and she did get some bruising but she thinks it helps \"because my hamstring tightness is why I can't bend my knee well. \"  Pain: Initial:   Pain Intensity 1: 0 0/10 Post Session:  0/10   Medications Last Reviewed:  2021    Updated Objective Findings:  0-97 deg knee ROM   TREATMENT:   Therapeutic Exercise: (30 min) (Done in order to improve mobility, strength, function and overall understanding of condition)   Date  11-10-21 Date  21 Date  21 Date  21 Date:  21   Activity/Exercise     Parameters   Education Discussed HEP Discussed HEP Discussed HEP Discussed HEP    Nu step 10 min, flexion 10 min, flexion 10 min, flexion  10 min, flexion  10 mins, flexion   Quad stretch Prone, contract relax with progressive increase in flexion Prone, contract relax with progressive increase in flexion Prone, contract relax with progressive increase in flexion Prone and abbi position, contract relax with progressive increase in flexion Prone and abbi position, contract relax with progressive increase in flexion   Hamstring curl · Prone and supine, manual resistance applied · Prone and supine, manual resistance applied · Prone and supine, manual resistance applied · Prone and supine, manual resistance applied, focused on keeping end range flexion · Prone manual resistance applied focused on keeping end range flexion   shuttle SL, 75lbs, 10x, 3 sets -  - SL 75# 10x, 3 sets   squat · Trx cables with anterior force from band, 10x, 3 sets  · Step down, 10x, 3 sets · Trx cables with anterior force from band, 10x, 3 sets, anterior tibial force provided   · Step down, 10x, 3 sets · Trx cables with anterior force from band, 10x, 3 sets, anterior tibial force provided  · In II bars, anterior tibia force given, 10x, 3 sets · TRX cables w/ anterior force from band 10x, 3 sets  · Heel tap 10x, 3 sets   Step over ·  Over a band, 10x, 2 sets Over a band, 20x 10\",     Quadruped rock backs ·   Band around femur: 20x Band around femur, 20x Band around femur, 20x      Manual Therapy: (10 min) (Done to improve mobility, reduce tone, guarding and pain)  · Patella distraction mobilization using mini plunger  · Soft tissue mobilization of quad tendon and quad with functional mobilizations   · Anterior tibial mobilization while knee is in max flexion    Modalities: (to help with hematoma)   -  MedBridge Portal  Treatment/Session Summary:    · Response to Treatment: Patient had no issues w/ therex this session. No increases in pain reported. Her ROM was improved post session. · Communication/Consultation:  None today  · Equipment provided today:  None today  · Recommendations/Intent for next treatment session: Next visit will focus on progressing established plan of care.   Total Treatment Billable Duration:  Manual-10 min, TE-30 min    PT Patient Time In/Time Out  Time In: 1554  Time Out: 100 [facility],

## 2021-12-14 ENCOUNTER — HOSPITAL ENCOUNTER (OUTPATIENT)
Dept: PHYSICAL THERAPY | Age: 56
Discharge: HOME | End: 2021-12-14
Payer: COMMERCIAL

## 2021-12-14 PROCEDURE — 97140 MANUAL THERAPY 1/> REGIONS: CPT

## 2021-12-14 PROCEDURE — 97110 THERAPEUTIC EXERCISES: CPT

## 2021-12-14 NOTE — PROGRESS NOTES
Armaan Vo  : 1965  Primary: Cassia Gone Essentials*  Secondary:  [facility] at [facility]  [address] , [address], [place] [address]  [phone]   [phone]        OUTPATIENT PHYSICAL THERAPY: Daily Treatment Note  2021    10     Progress note next visit  ICD-10: Treatment Diagnosis: Stiffness of left knee, not elsewhere classified (M25.662); History of total knee arthroplasty, left [X12.318]    Precautions/Allergies: Allergies   Allergen Reactions    Bee Sting [Sting, Bee] Hives    Pcn [Penicillins] Rash and Itching     redness     Fall Risk Score: 0 (? 5 = High Risk)  MD Orders: evaluate and treat  TREATMENT PLAN:  Effective Dates: 10/20/2021 TO 2022 (90 days). Frequency/Duration: 2 times a week for 90 Day(s) MEDICAL/REFERRING DIAGNOSIS:Acute pain of left knee [M25.562]  History of total knee arthroplasty, left [Z96.652]  DATE OF ONSET: several months  REFERRING PHYSICIAN:Deanna Ozuna MD  RETURN PHYSICIAN APPOINTMENT: did not specify        Pre-treatment Symptoms/Complaints:  Reports that she feels stiff today.    Pain: Initial:     0/10 Post Session:  0/10   Medications Last Reviewed:  2021    Updated Objective Findings:  0-86 deg knee ROM   TREATMENT:   Therapeutic Exercise: (30 min) (Done in order to improve mobility, strength, function and overall understanding of condition)   Date  21 Date:  21 Date  21   Activity/Exercise  Parameters    Education Discussed HEP     Nu step 10 min, flexion  10 mins, flexion 10 min max flexion   Quad stretch Prone and abbi position, contract relax with progressive increase in flexion Prone and abbi position, contract relax with progressive increase in flexion Prone and abbi position, contract relax with progressive increase in flexion   Hamstring curl · Prone and supine, manual resistance applied, focused on keeping end range flexion · Prone manual resistance applied focused on keeping end range flexion · Prone manual resistance applied focused on keeping end range flexion   shuttle - SL 75# 10x, 3 sets    squat · In II bars, anterior tibia force given, 10x, 3 sets · TRX cables w/ anterior force from band 10x, 3 sets  · Heel tap 10x, 3 sets · With anterior force, max depth, 20x  · Max lunge with anterior tibial displacement   Step over 10\",      Quadruped rock backs Band around femur, 20x Band around femur, 20x     Rotator cuff strengthening   ER- red band-10x  IR- red band-10x     Manual Therapy: (25 min) (Done to improve mobility, reduce tone, guarding and pain)  · Patella distraction mobilization using mini plunger (not done today)  · Soft tissue mobilization of quad tendon and quad with functional mobilizations   · Anterior tibial mobilization while knee is in max flexion    Modalities: (to help with hematoma)   -  MedBridge Portal  Treatment/Session Summary:    · Response to Treatment: Ms. Rosalino Farrell had moderate tightness of her knee joint today. We were able to get to 94 deg after the session. She also expressed issues with her shoulder so we took a quick glance and found that she has significant rotator cuff weakness bilateral. For this reason we added a rotator cuff strengthening program.   · Communication/Consultation:  None today  · Equipment provided today:  None today  · Recommendations/Intent for next treatment session: Next visit will focus on progressing established plan of care.   Total Treatment Billable Duration:  Manual-25 min, TE-30 min    PT Patient Time In/Time Out  Time In: 1500  Time Out: 1605    Gloria Delaney, PT, DPT

## 2021-12-16 ENCOUNTER — HOSPITAL ENCOUNTER (OUTPATIENT)
Dept: PHYSICAL THERAPY | Age: 56
Discharge: HOME | End: 2021-12-16
Payer: COMMERCIAL

## 2021-12-16 NOTE — PROGRESS NOTES
Kostas Martinez  : 1965  Primary: Bharti Groves Essentials*  Secondary:  [facility] at [place] [address], [address], [place] [address]  [phone]   [phone]        OUTPATIENT DAILY NOTE    NAME/AGE/GENDER: Kostas Martniez is a 55 y.o. female. DATE: 2021    Ms. Leon Lehman for today's appointment due to a schedule conflict.     Tura Collet, PT, DPT

## 2021-12-20 ENCOUNTER — HOSPITAL ENCOUNTER (OUTPATIENT)
Dept: PHYSICAL THERAPY | Age: 56
Discharge: HOME | End: 2021-12-20
Payer: COMMERCIAL

## 2021-12-20 PROCEDURE — 97110 THERAPEUTIC EXERCISES: CPT

## 2021-12-20 PROCEDURE — 97140 MANUAL THERAPY 1/> REGIONS: CPT

## 2021-12-20 NOTE — PROGRESS NOTES
Deborah Cain  : 1965  Primary: [facility] [employer]*  Secondary:  [facility] [place] [address], [address], [place] [address]  [phone]   [phone]        OUTPATIENT PHYSICAL THERAPY: Daily Treatment Note  2021    11     ICD-10: Treatment Diagnosis: Stiffness of left knee, not elsewhere classified (M25.662); History of total knee arthroplasty, left [Y12.179]    Precautions/Allergies: Allergies   Allergen Reactions    Bee Sting [Sting, Bee] Hives    Pcn [Penicillins] Rash and Itching     redness     Fall Risk Score: 0 (? 5 = High Risk)  MD Orders: evaluate and treat  TREATMENT PLAN:  Effective Dates: 2021 TO 3/20/2022 (90 days). Frequency/Duration: 1-2 times a week for 90 Day(s) MEDICAL/REFERRING DIAGNOSIS:Acute pain of left knee [M25.562]  History of total knee arthroplasty, left [Z96.652]  DATE OF ONSET: several months  REFERRING PHYSICIAN:Salma Ozuna MD  RETURN PHYSICIAN APPOINTMENT: did not specify        Pre-treatment Symptoms/Complaints:  Reports that she feels better today.    Pain: Initial:   Pain Intensity 1: 0 0/10 Post Session:  0/10   Medications Last Reviewed:  2021    Updated Objective Findings:  0-86 deg knee ROM   TREATMENT:   Therapeutic Exercise: (30 min) (Done in order to improve mobility, strength, function and overall understanding of condition)   Date:  21 Date  21 Date  21   Activity/Exercise Parameters     Education   Discussed HEP   Nu step 10 mins, flexion 10 min max flexion 10 min, max flexion   Quad stretch Prone and abbi position, contract relax with progressive increase in flexion Prone and abbi position, contract relax with progressive increase in flexion Prone and abbi position, contract relax with progressive increase in flexion   Hamstring curl · Prone manual resistance applied focused on keeping end range flexion · Prone manual resistance applied focused on keeping end range flexion · Prone manual resistance applied focused on keeping end range flexion   shuttle SL 75# 10x, 3 sets  100lbs, 10x, 2 sets   squat · TRX cables w/ anterior force from band 10x, 3 sets  · Heel tap 10x, 3 sets · With anterior force, max depth, 20x  · Max lunge with anterior tibial displacement · With anterior force, max depth, 20x  · Max lunge with anterior tibial displacement   Step over   10\" box   Quadruped rock backs Band around femur, 20x   -   Rotator cuff strengthening  ER- red band-10x  IR- red band-10x Discussed to do ER without resistance with proper form     Manual Therapy: (20 min) (Done to improve mobility, reduce tone, guarding and pain)  · Patella distraction mobilization using mini plunger (not done today)  · Soft tissue mobilization of quad tendon and quad with functional mobilizations   · Anterior tibial mobilization while knee is in max flexion    Modalities: (to help with hematoma)   -  [employer] Portal  Treatment/Session Summary:    · Response to Treatment: Ms. Alonso Herrera did better today compared to our last session but still limited to about 95-97 deg. We discussed to start tapering our sessions to see how well she does on her own and then determine our plan of care depending on how she is progressing. · Communication/Consultation:  None today  · Equipment provided today:  None today  · Recommendations/Intent for next treatment session: Next visit will focus on progressing established plan of care.   Total Treatment Billable Duration:  Manual-25 min, TE-20 min, re-eval 5 min    PT Patient Time In/Time Out  Time In: 1600  Time Out: 1650    Ginger Miranda, PT, DPT

## 2021-12-20 NOTE — THERAPY EVALUATION
John Galdamez  : 1965  Primary: [facility] [employer]*  Secondary:  [facility] [place] [address], [address], [place] [address]  [phone]   [phone]         OUTPATIENT PHYSICAL THERAPY:Recertification     ICD-10: Treatment Diagnosis: Stiffness of left knee, not elsewhere classified (M25.662); History of total knee arthroplasty, left [J18.124]    Precautions/Allergies: Allergies   Allergen Reactions    Bee Sting [Sting, Bee] Hives    Pcn [Penicillins] Rash and Itching     redness     Fall Risk Score: 0 (? 5 = High Risk)  MD Orders: evaluate and treat  TREATMENT PLAN:  Effective Dates: 2021 TO 3/20/2022 (90 days). Frequency/Duration: 1-2 times a week for 90 Day(s) MEDICAL/REFERRING DIAGNOSIS:Pain in left knee [M25.562]  Presence of left artificial knee joint [Z96.652]  DATE OF ONSET: several months  REFERRING PHYSICIAN:Dorothy Ozuna MD  RETURN PHYSICIAN APPOINTMENT: did not specify       ASSESSMENT:  Ms. Campos Camacho has come for a total of 11 visits since starting physical therapy on 10-20-21. During that time she has made improvements in ROM but still tighter compared to the conntralateral side. At this time she averages about 95 deg of knee flexion but is able to function at a high capacity and is relatively pain free. We discussed tapering her sessions to see how well she does on her own before determining our plan of care going forward. PROBLEM LIST (Impacting functional limitations):  1. Left knee tightness  2. Quad and hip wkns   3. Antalgic gait   4. Unable to cycle INTERVENTIONS PLANNED:  1. Home Exercise Program (HEP)  2. Manual Therapy  3. Therapeutic Exercise/Strengthening  4. Cryotherapy w/ vaso-pneumatic compression  5. Dry needling      GOALS: (Goals have been discussed and agreed upon with patient.)  SHORT-TERM FUNCTIONAL GOALS: Time Frame: 2-4 wks  1.   Patient will report 25-50% reduction in overall symptoms (MET)  2. Patient will be compliant with HEP and plan of care (MET)  3. Patient will improved knee flexion by 15-20 deg (ongoing)   4. Patient will improve on the LEFS by 3-5 points  (MET)  DISCHARGE GOALS: Time Frame: 6-8 wk  1. Patient will report % reduction in overall symptoms in order to be able to have full function with decreased symptoms (ongoing)  2. Patient will report feeling comfortable progressing their own plan of care with the home program prescribed (ongoing)   3. Patient will report being able to cycle with minimal irritation (has not attempted a spin class yet but is able to ride a stationary bicycle)   4. Patient will improve on the LEFS by 8-10 points in order to demonstrate improved function with less pain(ongoing)     Rehabilitation Potential For Stated Goals: [facility] therapy, I certify that the treatment plan above will be carried out by a therapist or under their direction. Thank you for this referral,  Nitish Carrillo PT,DPT              HISTORY:   History of Present Injury/Illness (Reason for Referral): Reports that she has had both knees replaced. Right was done 10 yrs ago and left was 1.5 yrs ago. She initially did well with rehab with her left knee but during covid and do to helping take care of her , she felt like she lost ROM to the point where she is unable to ride her bike anymore.  Her goal is to be able to ride her bike again without limitations   Past Medical History/Comorbidities:   Past Medical History:   Diagnosis Date    Arthritis     knees    HDL deficiency     managed with medication    Hypertension     Rheumatic fever     Scarlet fever as a child     Social History/Living Environment: lives in a house, bedroom on first level  Prior Level of Function/Work/Activity:independent  Current Medications:       Current Outpatient Medications:     diclofenac EC (VOLTAREN) 75 mg EC tablet, Take 75 mg by mouth two (2) times a day., Disp: , Rfl:     CALCIUM CARB-VIT D2-MINERALS PO, Take 1 Tab by mouth two (2) times a day., Disp: , Rfl:     hydroCHLOROthiazide (HYDRODIURIL) 25 mg tablet, Take 25 mg by mouth daily. , Disp: , Rfl:     diphenhydrAMINE-acetaminophen (TYLENOL PM EXTRA STRENGTH)  mg tab, Take 2 Tabs by mouth every six (6) hours as needed for Pain., Disp: , Rfl:     traMADol (ULTRAM) 50 mg tablet, Take 50 mg by mouth every six (6) hours as needed for Pain. Indications: Pain, Disp: , Rfl:     vit A/vit C/vit E/zinc/copper (ICAPS AREDS PO), Take 1 Cap by mouth two (2) times a day., Disp: , Rfl:     LOSARTAN PO, Take 100 mg by mouth daily. , Disp: , Rfl:     atorvastatin (LIPITOR) 10 mg tablet, Take 10 mg by mouth nightly., Disp: , Rfl:     diclofenac (VOLTAREN) 1 % gel, Apply 2 g to affected area four (4) times daily. , Disp: , Rfl:     calcium carb-vit d2-minerals (CALTRATE PLUS) Tab, Take 1 Tab by mouth two (2) times a day., Disp: , Rfl:     acyclovir (ZOVIRAX) 400 mg tablet, Take 400 mg by mouth daily. Takes in am , Disp: , Rfl:     phentermine (ADIPEX-P) 37.5 mg tablet, Take 37.5 mg by mouth every morning., Disp: , Rfl:   Date Last Reviewed: 12/20/2021     Ambulatory/Rehab Services H2 Model Falls Risk Assessment    Risk Factors:       No Risk Factors Identified Ability to Rise from Chair:       (0)  Ability to rise in a single movement    Falls Prevention Plan:       No modifications necessary   Total: (5 or greater = High Risk): 0    ©2010 [facility] [address] [place] [address] Patent #[id number].  Federal Law prohibits the replication, distribution or use without written permission from [facility] [employer]      Number of Personal Factors/Comorbidities that affect the Plan of Care: 0: LOW COMPLEXITY   EXAMINATION:   (Abbreviations: P-pain, NP- no pain, wnl-within normal limits)  Observation/Orthostatic Postural Assessment:    Relaxed standing posture:  · Posterior tilt, external rotation of the femur   · Bilateral bunion     Palpation/tone/tissue texture:    ASIS: symmetrical   PSIS: symmetrical   Leg Length: supine: symmetrical        Long Sitting: symmetrical       Hip Position: -    Soft tissue:  · Hip flexors: increased quad tightness on L side  · Hamstrings:within normal limits bilateral   · IT band: n/a  · gastroc/soleus/posterior tibialis: mild tightness bilateral      ROM:   Knee ROM Date:  10-20-21    Date  12-20-21    Right Left    Flexion 110 82 95   Extension 0 0 0      Hip ROM Date:  12/21/2021       Right Left   Flexion wnl wnl   Extension wnl wnl   IR wnl wnl   ER wnl wnl           Strength:     Hip Strength Date:  12/21/2021       Right Left   Flexion 5 5   Extension 5 5   IR n/a n/a   ER n/a n/a   Abduction 5 4      Knee Strength Date:  12/21/2021       Right Left   Flexion 5 4   Extension 5 4+             Special Tests: n/a    Neurological Screen:   Myotomes: strong in bilateral LE's     Dermatomes: n/a         Reflexes: n/a         Neural Tension Tests: n/a  Functional Mobility:    · Double leg squat: able to do a functional squat  · Single leg squat:  L: able to do a partial squat         R: able to do a partial squat  · Gait Pattern:abrupt heel strike bilateral, decreased inonimate and trunk and mobility, decreased terminal stance control on R side (improving)  Balance:  Single leg   L: n/a  R: n/a          CLINICAL DECISION MAKING:   Outcome Measure: Tool Used: Lower Extremity Functional Scale (LEFS)  Score:  Initial: 69/80 Most Recent: 74/80 (Date: 12-20-21 )   Interpretation of Score: 20 questions each scored on a 5 point scale with 0 representing \"extreme difficulty or unable to perform\" and 4 representing \"no difficulty\". The lower the score, the greater the functional disability. 80/80 represents no disability. Minimal detectable change is 9 points.       Medical Necessity:   · Patient is expected to demonstrate progress in strength, range of motion and symptom levels to return to full function. Reason for Services/Other Comments:  · Patient continues to require skilled intervention due to ongoing symptoms. · Pain: Initial:   0/10 at rest, just feels tightness Post Session:  0/10 ·   Compliance with Program/Exercises: compliant   · Recommendations/Intent for next treatment session: \"Next visit will focus on progressing the patients plan of care\".     Future Appointments   Date Time Provider Usha Ann   12/28/2021  3:15 PM Cary Weems, PT, DPT [facility]     Total Treatment Duration: 45 min, re-eval 5 min  PT Patient Time In/Time Out  Time In: 1600  Time Out: [address]      Ally Amador, PT, DPT

## 2021-12-28 ENCOUNTER — APPOINTMENT (OUTPATIENT)
Dept: PHYSICAL THERAPY | Age: 56
End: 2021-12-28
Payer: COMMERCIAL

## 2022-03-19 PROBLEM — M17.12 ARTHRITIS OF KNEE, LEFT: Status: ACTIVE | Noted: 2019-06-18

## 2022-03-19 PROBLEM — Z96.652 S/P TKR (TOTAL KNEE REPLACEMENT) USING CEMENT, LEFT: Status: ACTIVE | Noted: 2019-06-19

## 2022-05-23 ENCOUNTER — TELEPHONE (OUTPATIENT)
Dept: ORTHOPEDIC SURGERY | Age: 57
End: 2022-05-23

## 2022-05-23 NOTE — TELEPHONE ENCOUNTER
Pt called and both knees were replaced by [facility]. Both knees are swollen and painful and asked to be worked in with [employer] or [place].  Next available was 6/15

## 2022-06-15 ENCOUNTER — OFFICE VISIT (OUTPATIENT)
Dept: ORTHOPEDIC SURGERY | Age: 57
End: 2022-06-15
Payer: COMMERCIAL

## 2022-06-15 DIAGNOSIS — M25.562 ACUTE BILATERAL KNEE PAIN: Primary | ICD-10-CM

## 2022-06-15 DIAGNOSIS — M25.561 ACUTE BILATERAL KNEE PAIN: Primary | ICD-10-CM

## 2022-06-15 DIAGNOSIS — Z96.652 TOTAL KNEE REPLACEMENT STATUS, LEFT: ICD-10-CM

## 2022-06-15 DIAGNOSIS — Z09 SURGERY FOLLOW-UP: ICD-10-CM

## 2022-06-15 DIAGNOSIS — Z96.651 PRESENCE OF TOTAL KNEE JOINT PROSTHESIS, RIGHT: ICD-10-CM

## 2022-06-15 PROCEDURE — 99214 OFFICE O/P EST MOD 30 MIN: CPT | Performed by: ORTHOPAEDIC SURGERY

## 2022-06-15 NOTE — PROGRESS NOTES
Name: Tan Antoine  YOB: 1965  Gender: female  MRN: [id number]    CC: Bilateral knee pain and swelling status post remote total knee arthroplasty    HPI: Tan Antoine is a 65 y.o. female who is well-known to me having undergone right total knee arthroplasty with a Manjit tricompartmental cemented implant in 2011 and a DePuy cemented rotating platform total knee arthroplasty in 2019 comes in today complaining of an episode of significant swelling in both knees. She has generally done well with her surgery but did find her left knee to be more difficult to recover from and had some increased stiffness with it that has been persistent. She has been working on weight loss and has actually lost about 60 pounds in the last saw her. In the course of this she is gotten fairly intense in her workouts and in early to mid May had a period where she ran up and down the stairs for 4 sets. She seemed to tolerate this well but a few days later had significant swelling in both knees and had to limit her weightbearing with a walker for several days. She called to be seen and comes in for that today. Her symptoms have now resolved and now states she is back to her baseline function. History was obtained from patient    ROS/Meds/PSH/PMH/FH/SH: I personally reviewed the patients standard intake form. Below are the pertinents    Tobacco:  reports that she has never smoked.  She has never used smokeless tobacco.  Past Medical History:   Diagnosis Date    Arthritis     knees    HDL deficiency     managed with medication    Hypertension     Rheumatic fever     Scarlet fever as a child      Past Surgical History:   Procedure Laterality Date    ABDOMINOPLASTY  1996    tummy tuck    APPENDECTOMY  2009    BREAST REDUCTION SURGERY  1994    HYSTEROSCOPY  12/21/2017    KNEE ARTHROSCOPY Right 2011    right knee with reconstruction    TONSILLECTOMY          Physical Examination:  Physical exam: On examination of the patient's gait there is no obvious limp. She does have some relative varus to both knees in stance which is unchanged from previous evaluations    On examination while standing there is decreased flexion in the lumbosacral spine without acute list or spasm. While seated ,  the Bilateral hip is examined there is full range of motion without obvious issue . On examination of the  Left knee :ROM is 0  to 108 There is no obvious effusion. On examination of the  Left knee :ROM is 0 to 125 There is a well-healed midline incision with appropriate range of motion and balance. Patient is neurologically intact distally. Skin is intact. Imaging:   Radiographs:    AP standing, flexion lateral, and sunrise views of both knees were obtained this demonstrated  Well-positioned total knee arthroplasty with stable fixation. Radiographic impression: Well-positioned and stable bilateral Knee arthroplasties    Assessment:   Stable bilateral total knee arthroplasties now more than 2 years out from left total knee arthroplasty and more than 10 years status post right total knee arthroplasty with a period of significant swelling and discomfort following a intense episode of exercise. She is back to her baseline function which is very high. She does have some limitation in flexion in the left knee which is been persistent. This may make her more prone to issues with stair climbing and we discussed this. We discussed moderation activity and appropriate expectations of what her knee replacements can do. I do not think anything further at this point needs to be done but if she begins having more persistent episodes and certainly further radiographic and clinical work-up would be appropriate. She will follow back up as needed. Plan:       Follow up:   Return for As needed.      Devika Gutierrez MD

## (undated) DEVICE — TRAY PREP DRY W/ PREM GLV 2 APPL 6 SPNG 2 UNDPD 1 OVERWRAP

## (undated) DEVICE — SYR LR LCK 1ML GRAD NSAF 30ML --

## (undated) DEVICE — BUTTON SWITCH PENCIL BLADE ELECTRODE, HOLSTER: Brand: EDGE

## (undated) DEVICE — Device: Brand: POWER-FLO®

## (undated) DEVICE — DRAPE SHT 3 QTR PROXIMA 53X77 --

## (undated) DEVICE — REM POLYHESIVE ADULT PATIENT RETURN ELECTRODE: Brand: VALLEYLAB

## (undated) DEVICE — GOWN,REINF,POLY,ECL,PP SLV,XL: Brand: MEDLINE

## (undated) DEVICE — 2000CC GUARDIAN II: Brand: GUARDIAN

## (undated) DEVICE — CURETTE BNE CEM 10IN DISP --

## (undated) DEVICE — NEEDLE HYPO 18GA L1.5IN PNK S STL HUB POLYPR SHLD REG BVL

## (undated) DEVICE — SOLUTION IRRIG 3000ML 0.9% SOD CHL FLX CONT 0797208] ICU MEDICAL INC]

## (undated) DEVICE — TRAY CATH 16F DRN BG LTX -- CONVERT TO ITEM 363158

## (undated) DEVICE — Z DISCONTINUED USE 2744636  DRESSING AQUACEL 14 IN ALG W3.5XL14IN POLYUR FLM CVR W/ HYDRCOLL

## (undated) DEVICE — SUTURE STRATAFIX SPRL SZ 1 L14IN ABSRB VLT L48CM CTX 1/2 SXPD2B405

## (undated) DEVICE — BIPOLAR SEALER 23-112-1 AQM 6.0: Brand: AQUAMANTYS ®

## (undated) DEVICE — DRAPE,TOP,102X53,STERILE: Brand: MEDLINE

## (undated) DEVICE — X-RAY SPONGES,12 PLY: Brand: DERMACEA

## (undated) DEVICE — SUTURE VCRL SZ 2-0 L27IN ABSRB UD L36MM CP-1 1/2 CIR REV J266H

## (undated) DEVICE — BANDAGE COMPR SELF ADH 5 YDX4 IN TAN STRL PREMIERPRO LF

## (undated) DEVICE — (D)PREP SKN CHLRAPRP APPL 26ML -- CONVERT TO ITEM 371833

## (undated) DEVICE — 3M™ IOBAN™ 2 ANTIMICROBIAL INCISE DRAPE 6650EZ: Brand: IOBAN™ 2

## (undated) DEVICE — HANDPIECE SET WITH COAXIAL HIGH FLOW TIP AND SUCTION TUBE: Brand: INTERPULSE

## (undated) DEVICE — CATHETER URETH 12FR 2CC BLLN SIL ELASTMR F 2 W BARDX

## (undated) DEVICE — SOLUTION IV 1000ML 0.9% SOD CHL

## (undated) DEVICE — SYR 50ML LR LCK 1ML GRAD NSAF --

## (undated) DEVICE — BLADE SAW W12.5XL70MM THK0.8MM CUT THK1.12MM S STL RECIP

## (undated) DEVICE — DUAL CUT SAGITTAL BLADE

## (undated) DEVICE — SUTURE VCRL SZ 1 L27IN ABSRB UD L36MM CP-1 1/2 CIR REV CUT J268H

## (undated) DEVICE — MEDI-VAC NON-CONDUCTIVE SUCTION TUBING: Brand: CARDINAL HEALTH

## (undated) DEVICE — SOLUTION IV 500ML 0.9% SOD CHL FLX CONT

## (undated) DEVICE — SOLUTION IRRIG 1000ML H2O STRL BLT

## (undated) DEVICE — SLIM BODY SKIN STAPLER: Brand: APPOSE ULC

## (undated) DEVICE — BLADE SAW PAT RMR PILT H 46MM --

## (undated) DEVICE — DRAPE,U/SHT,SPLIT,FILM,60X84,STERILE: Brand: MEDLINE

## (undated) DEVICE — T4 HOOD

## (undated) DEVICE — MEDI-VAC YANKAUER SUCTION HANDLE W/BULBOUS TIP: Brand: CARDINAL HEALTH

## (undated) DEVICE — PACK PROCEDURE SURG TOT KNEE

## (undated) DEVICE — 3000CC GUARDIAN II: Brand: GUARDIAN